# Patient Record
Sex: FEMALE | Race: WHITE | NOT HISPANIC OR LATINO | Employment: OTHER
[De-identification: names, ages, dates, MRNs, and addresses within clinical notes are randomized per-mention and may not be internally consistent; named-entity substitution may affect disease eponyms.]

---

## 2017-08-24 ENCOUNTER — HISTORIC RESULTS (OUTPATIENT)
Dept: ADMINISTRATIVE | Age: 72
End: 2017-08-24

## 2019-05-07 ENCOUNTER — DOCUMENTATION ONLY (OUTPATIENT)
Dept: OTHER | Facility: CLINIC | Age: 74
End: 2019-05-07

## 2019-12-06 ENCOUNTER — TRANSFERRED RECORDS (OUTPATIENT)
Dept: HEALTH INFORMATION MANAGEMENT | Facility: OTHER | Age: 74
End: 2019-12-06

## 2019-12-06 LAB
CREAT SERPL-MCNC: 0.58 MG/DL (ref 0.57–1.11)
GFR SERPL CREATININE-BSD FRML MDRD: >60 ML/MIN/1.73M2
GLUCOSE SERPL-MCNC: 92 MG/DL (ref 70–100)
POTASSIUM SERPL-SCNC: 3.7 MMOL/L (ref 3.5–5.1)

## 2019-12-11 DIAGNOSIS — Z86.73 HISTORY OF STROKE: Primary | ICD-10-CM

## 2019-12-19 ENCOUNTER — HOSPITAL ENCOUNTER (OUTPATIENT)
Dept: SPEECH THERAPY | Facility: OTHER | Age: 74
Setting detail: THERAPIES SERIES
End: 2019-12-19
Attending: PHYSICAL MEDICINE & REHABILITATION
Payer: COMMERCIAL

## 2019-12-19 ENCOUNTER — HOSPITAL ENCOUNTER (OUTPATIENT)
Dept: OCCUPATIONAL THERAPY | Facility: OTHER | Age: 74
Setting detail: THERAPIES SERIES
End: 2019-12-19
Attending: PHYSICAL MEDICINE & REHABILITATION
Payer: COMMERCIAL

## 2019-12-19 DIAGNOSIS — Z86.73 HISTORY OF STROKE: ICD-10-CM

## 2019-12-19 DIAGNOSIS — F32.A DEPRESSION: ICD-10-CM

## 2019-12-19 DIAGNOSIS — G25.81 RESTLESS LEGS SYNDROME (RLS): ICD-10-CM

## 2019-12-19 DIAGNOSIS — E87.1 HYPONATREMIA: ICD-10-CM

## 2019-12-19 DIAGNOSIS — E53.8 VITAMIN B12 DEFICIENCY: ICD-10-CM

## 2019-12-19 DIAGNOSIS — B96.20 E. COLI UTI: ICD-10-CM

## 2019-12-19 DIAGNOSIS — R33.9 URINARY RETENTION: ICD-10-CM

## 2019-12-19 DIAGNOSIS — J30.9 ALLERGIC RHINITIS: ICD-10-CM

## 2019-12-19 DIAGNOSIS — R04.0 EPISTAXIS: ICD-10-CM

## 2019-12-19 DIAGNOSIS — E55.9 VITAMIN D DEFICIENCY: ICD-10-CM

## 2019-12-19 DIAGNOSIS — I63.9 STROKE (H): ICD-10-CM

## 2019-12-19 DIAGNOSIS — M81.0 POSTMENOPAUSAL OSTEOPOROSIS: ICD-10-CM

## 2019-12-19 DIAGNOSIS — G93.40 ENCEPHALOPATHY: ICD-10-CM

## 2019-12-19 DIAGNOSIS — E78.5 DYSLIPIDEMIA: ICD-10-CM

## 2019-12-19 DIAGNOSIS — I71.21 ANEURYSM, ASCENDING AORTA (H): ICD-10-CM

## 2019-12-19 DIAGNOSIS — M19.90 DEGENERATIVE ARTHRITIS: ICD-10-CM

## 2019-12-19 DIAGNOSIS — G47.33 OSA (OBSTRUCTIVE SLEEP APNEA): ICD-10-CM

## 2019-12-19 DIAGNOSIS — I60.9 SAH (SUBARACHNOID HEMORRHAGE) (H): ICD-10-CM

## 2019-12-19 DIAGNOSIS — J34.1 MAXILLARY SINUS CYST: ICD-10-CM

## 2019-12-19 DIAGNOSIS — B37.31 YEAST INFECTION OF THE VAGINA: ICD-10-CM

## 2019-12-19 DIAGNOSIS — I10 HTN (HYPERTENSION): ICD-10-CM

## 2019-12-19 DIAGNOSIS — I82.409 DVT (DEEP VENOUS THROMBOSIS) (H): ICD-10-CM

## 2019-12-19 DIAGNOSIS — M47.816 OSTEOARTHRITIS OF LUMBAR SPINE: ICD-10-CM

## 2019-12-19 DIAGNOSIS — H53.2 DIPLOPIA: ICD-10-CM

## 2019-12-19 DIAGNOSIS — E03.9 HYPOTHYROIDISM: ICD-10-CM

## 2019-12-19 DIAGNOSIS — D64.9 ANEMIA: ICD-10-CM

## 2019-12-19 DIAGNOSIS — N39.0 E. COLI UTI: ICD-10-CM

## 2019-12-19 DIAGNOSIS — F17.200 TOBACCO USE DISORDER: ICD-10-CM

## 2019-12-19 DIAGNOSIS — G47.00 INSOMNIA: ICD-10-CM

## 2019-12-19 PROCEDURE — 97530 THERAPEUTIC ACTIVITIES: CPT | Mod: GO | Performed by: OCCUPATIONAL THERAPIST

## 2019-12-19 PROCEDURE — 97165 OT EVAL LOW COMPLEX 30 MIN: CPT | Mod: GO | Performed by: OCCUPATIONAL THERAPIST

## 2019-12-19 PROCEDURE — 96125 COGNITIVE TEST BY HC PRO: CPT | Mod: GN

## 2019-12-19 RX ORDER — FAMOTIDINE 40 MG/1
20 TABLET, FILM COATED ORAL 2 TIMES DAILY
COMMUNITY

## 2019-12-19 RX ORDER — FERROUS GLUCONATE 324(37.5)
1 TABLET ORAL 2 TIMES DAILY
COMMUNITY

## 2019-12-19 RX ORDER — ALBUTEROL SULFATE 90 UG/1
2 AEROSOL, METERED RESPIRATORY (INHALATION) 4 TIMES DAILY PRN
COMMUNITY
Start: 2016-06-20

## 2019-12-19 RX ORDER — ACETAMINOPHEN 325 MG/1
650 TABLET ORAL EVERY 4 HOURS PRN
COMMUNITY

## 2019-12-19 RX ORDER — CLONIDINE HYDROCHLORIDE 0.1 MG/1
0.1 TABLET ORAL 2 TIMES DAILY
COMMUNITY
Start: 2017-08-24

## 2019-12-19 RX ORDER — LOSARTAN POTASSIUM 50 MG/1
75 TABLET ORAL 2 TIMES DAILY
COMMUNITY
Start: 2017-11-16

## 2019-12-19 RX ORDER — AMOXICILLIN 250 MG
2 CAPSULE ORAL DAILY
COMMUNITY

## 2019-12-19 RX ORDER — FLUTICASONE PROPIONATE 50 MCG
1 SPRAY, SUSPENSION (ML) NASAL 2 TIMES DAILY
COMMUNITY

## 2019-12-19 RX ORDER — AMLODIPINE BESYLATE 10 MG/1
10 TABLET ORAL DAILY
COMMUNITY

## 2019-12-19 RX ORDER — PRAMIPEXOLE DIHYDROCHLORIDE 0.5 MG/1
0.5 TABLET ORAL AT BEDTIME
COMMUNITY

## 2019-12-19 RX ORDER — LANOLIN ALCOHOL/MO/W.PET/CERES
800 CREAM (GRAM) TOPICAL DAILY
COMMUNITY

## 2019-12-19 RX ORDER — TAMSULOSIN HYDROCHLORIDE 0.4 MG/1
0.8 CAPSULE ORAL AT BEDTIME
COMMUNITY

## 2019-12-19 RX ORDER — NITROGLYCERIN 0.4 MG/1
0.4 TABLET SUBLINGUAL
COMMUNITY
Start: 2017-10-11

## 2019-12-19 RX ORDER — METOPROLOL TARTRATE 25 MG/1
25 TABLET, FILM COATED ORAL 2 TIMES DAILY
COMMUNITY

## 2019-12-19 RX ORDER — ATORVASTATIN CALCIUM 80 MG/1
80 TABLET, FILM COATED ORAL AT BEDTIME
COMMUNITY

## 2019-12-19 RX ORDER — LANOLIN ALCOHOL/MO/W.PET/CERES
1 CREAM (GRAM) TOPICAL EVERY 4 HOURS PRN
COMMUNITY

## 2019-12-19 RX ORDER — ASPIRIN 81 MG/1
81 TABLET ORAL DAILY
COMMUNITY
Start: 2016-02-05

## 2019-12-19 ASSESSMENT — VISUAL ACUITY: OU: BLURRY

## 2019-12-19 NOTE — PROGRESS NOTES
"   12/19/19 1400   Quick Adds   Quick Adds Certification   Type of Visit Initial Outpatient Occupational Therapy Evaluation   General Information   Start Of Care Date 12/19/19   Referring Physician Yossi Cardona md   Orders Evaluate and treat as indicated   Other Orders Driving if applicable.    Orders Date 12/11/19   Medical Diagnosis Z86.73 (ICD-10-CM) - History of stroke   Onset of Illness/Injury or Date of Surgery 11/08/19   Special Instructions     Surgical/Medical History Reviewed Yes   Additional Occupational Profile Info/Pertinent History of Current Problem Patient reports she had a CVA on 11/8/2019. SHe was flown to [facility]. She was in the hospital from 11/8/2019 until 12/17/2019.  She was in inpatient rehab for part of that time though is not sure how long. She is unsure how long was in each setting. SHe is wearing an eye patch and reprots she sees double. SHe is to change the eye patch form one eye to the other. She states she was not told that she can not drive, however she is not wanting to drive until her vision is better.    Comments/Observations Pt. struggles with decrease vision, FWW and catheter cord.    Role/Living Environment   Patient role/Employment history Retired   Current Living Environment House   Number of Stairs to Enter Home 4   Number of Stairs Within Home to basement for washer and dryer.    Primary Bathroom Location/Comments mainfloor   Prior Level - Transfers Independent   Prior Level - Ambulation Independent   Prior Level - ADLS Independent   Current Assistive Devices - Mobility Front wheeled walker   Role/Living Environment Comments Patient's  drove patient. He seems attentive to her needs and willing nad able ot assist as needed.   Patient/family Goals Statement \"to get rid of the catheter.    Pain   Patient currently in pain No;Denies   Fall Risk Screen   Fall screen completed by OT   Have you fallen 2 or more times in the past year? No   Have you fallen and had " an injury in the past year? No   Is patient a fall risk? Department fall risk interventions implemented   Abuse Screen (yes response referral indicated)   Feels Unsafe at Home or Work/School no   Feels Threatened by Someone no   Does Anyone Try to Keep You From Having Contact with Others or Doing Things Outside Your Home? no   Physical Signs of Abuse Present no   Cognitive Status Examination   Orientation Orientation to person, place and time   Level of Consciousness Alert   Memory Impaired  (reports no change was having trouble thinking before. )   Memory Comments reports no change was having trouble thinking before.    Attention No deficits were identified   Visual Perception   Visual Perception Wears glasses   Visual Acuity Blurry   Visual Field WFL   Visual Attention WNL   Oculomotor lazy right eye   Sensation   Sensation Comments Denies any numbness or tingling in her fingers.    Range of Motion (ROM)   ROM Comments WNL   Hand Strength   Hand Dominance Right   Left Hand  (pounds) 43 pounds   Right Hand  (pounds) 43 pounds   Left Lateral Pinch (pounds) 10 pounds   Right Lateral Pinch (pounds) 10 pounds   Left Three Point Pinch (pounds) 8 pounds   Right Three Point Pinch (pounds) 9 pounds   Muscle Tone   Muscle Tone No deficits were identified   Coordination   Upper Extremity Coordination No deficits were identified   Planned Therapy Interventions   Planned Therapy Interventions Coordination training;Therapeutic activities;Visual perception    OT Goal 1   Goal Description Patient will demonstrate the ability to perform 4-corner jumps with no head movement 95% of the time.    Target Date 01/09/20    OT Goal 2   Goal Description Pateint will report the ability to perform home program independently or with assist from  as needed   Target Date 01/09/20    OT Goal 3   Goal Description Pateint will report a 50 % decrease in symptoms    Target Date 10/29/20   Clinical Impression   Criteria for Skilled  Therapeutic Interventions Met Yes, treatment indicated   OT Diagnosis decline in self cares and home management    Influenced by the following impairments decrease in vision   Assessment of Occupational Performance 1-3 Performance Deficits   Identified Performance Deficits driving, cooking,    Clinical Decision Making (Complexity) Low complexity   Therapy Frequency 1-2x weeks   Predicted Duration of Therapy Intervention (days/wks) 8 weeks   Risks and Benefits of Treatment have been explained. Yes   Patient, Family & other staff in agreement with plan of care Yes   Clinical Impression Comments patient is able to function well with one eye covered at this time.    Education Assessment   Barriers To Learning No Barriers   Preferred Learning Style Listening;Reading;Demonstration;Pictures/video   Therapy Certification   Certification date from 12/19/19   Certification date to 02/17/20   Total Evaluation Time   OT Eval, Low Complexity Minutes (40432) 20

## 2019-12-19 NOTE — PROGRESS NOTES
[facility]          OUTPATIENT OCCUPATIONAL THERAPY  EVALUATION  PLAN OF TREATMENT FOR OUTPATIENT REHABILITATION  (COMPLETE FOR INITIAL CLAIMS ONLY)  Patient's Last Name, First Name, M.I.  YOB: 1945  Altagracia Johnson                        Provider's Name  [facility] Medical Record No.  [id number]                               Onset Date:     11/08/19   Start of Care Date:     12/19/19   Type:     ___PT   _X_OT   ___SLP Medical Diagnosis:     Z86.73 (ICD-10-CM) - History of stroke                          OT Diagnosis:     decline in self cares and home management  Visits from SOC:  1   _________________________________________________________________________________  Plan of Treatment/Functional Goals:  Coordination training, Therapeutic activities, Visual perception                    Goals     Goal Description: Patient will demonstrate the ability to perform 4-corner jumps with no head movement 95% of the time.   Target Date: 01/09/20        Goal Description: Pateint will report the ability to perform home program independently or with assist from  as needed  Target Date: 01/09/20        Goal Description: Pateint will report a 50 % decrease in symptoms   Target Date: 10/29/20         Therapy Frequency: 1-2x weeks     Predicted Duration of Therapy Intervention (days/wks): 8 weeks  Jessenia Chris OT          I CERTIFY THE NEED FOR THESE SERVICES FURNISHED UNDER        THIS PLAN OF TREATMENT AND WHILE UNDER MY CARE .             Physician Signature               Date    X_____________________________________________________         Chente Nguyen MD                ,    Certification date from: 12/19/19, Certification date to: 02/17/20               Referring Physician: Yossi Cardona md/ Primary care provider Chente Nguyen MD       Initial Assessment        See Epic  Evaluation      Start Of Care Date: 12/19/19

## 2019-12-23 NOTE — PROGRESS NOTES
12/20/19 1200   General Information   Type of Evaluation Cognitive-Linguistic   Type Of Visit Initial   Start Of Care Date 12/19/19   Referring Physician Delio Cardona   Orders Evaluate And Treat   Medical Diagnosis History of stroke    Onset Of Illness/injury Or Date Of Surgery 11/08/19   Hearing WFL   Surgical/Medical history reviewed Yes   Pertinent History Of Current Problem Patient is a 74-year-old female who presents to outpatient SLP evaluation with her . Patient suffered a stroke on 11/8/2019 and was hospitalized in [place] until 12/17/2019. During her inpatient stay, patient was participating in speech therapy to address high level cognitive linguistic deficits including attention and memory.  At this time, patient and  report ongoing cognitive deficits including short term memory and attention. Patient and spouse would like to pursue outpatient therapy to increase cognitive linguistic skills to baseline.     Current Community Support  Family/friend caregiver;Therapy services   Living environment [facility]   Patient/family Goals Altagracia is agreeable to all evaluation tasks. Altagracia reports continued mild cognitive deficits, however reports she feels she is nearing baseline following inpatient therapy. Altagracia and her  would like to pursue outpatient therapy to increase cognitive skills to baseline.    Fall Risk Screen   Fall screen completed by OT   Have you fallen 2 or more times in the past year? No   Have you fallen and had an injury in the past year? No   Is patient a fall risk? Department fall risk interventions implemented   Abuse Screen (yes response referral indicated)   Feels Unsafe at Home or Work/School no   Feels Threatened by Someone no   Does Anyone Try to Keep You From Having Contact with Others or Doing Things Outside Your Home? no   Physical Signs of Abuse Present no   Pain Assessment   Pain Reported No   Speech   Speech Comments Speech clear; no dysarthria present     Language: Auditory Comprehension (understanding of spoken language)   Comments (Auditory Comprehension) Able to follow directions for participation in evaluation tasks; able to participate in conversation. No comprehension deficits observed.    Language: Verbal Expression (use of spoken language to express information)   Comments (Verbal Expression) Patient participating in social and medical conversations with no word-finding difficulty observed.    Cognitive Status Examination   Attention impaired   Behavioral Observations alert;WFL   Orientation x4   Visual Impairments Include other (see comments)  (Double vision. Patient wears an eyepatch to correct)   Short Term Memory impaired   Long Term Memory intact   Reasoning intact   Organization Mildly impaired    Executive Function Deficits Noted organization   Standardized cognitive-linguistic assessment completed CLQT   Education Assessment   Barriers to Learning Cognitive  (Attention; Memory )   Preferred Learning Style Listening;Demonstration;Pictures/video   General Therapy Interventions   Planned Therapy Interventions Cognitive Treatment   Cognitive treatment Internal memory strategy training;Progressive attention training;External memory strategy training   Clinical Impression, SLP Eval   Criteria for Skilled Therapeutic Interventions Met (SLP Eval) skilled criteria for speech language intervention met   SLP Diagnosis Mild cognitive deficits    Influenced by the following factors/impairments CVA on 11/8/2019   Functional limitations due to impairments Difficulty with immediate recall; difficulty attending to tasks    Rehab potential affected by Altagracia is highly motivated to participate in outpatient speech therapy. Her , Santiago, is very supportive.    Therapy Frequency 1 time;2 times;per week   Predicted Duration of Therapy Intervention (days/wks) 60 days    Risks and Benefits of Treatment have been explained. Yes   Patient, Family & other staff in  agreement with plan of care Yes   Clinical Impression Comments Altagracia will benefit from outpatient speech therapy to address cognitive deficits, including memory , attention, and organization.    Cognitive/Communication Goals   Cognitive/Communication Goals 1;2;3;4;5   Cognitive/Communication Goal 1   Goal Identifier Sustained Attention    Goal Description Altagracia will demonstrate sustained attention by maintaining focus during a task for 5 minutes with minimum cues from current level of 2 minutes.    Target Date 02/17/20   Cognitive/Communication Goal 2   Goal Identifier Functional Immediate Recall    Goal Description Altagracia will recall 90% of relevant details immediately following verbal presentation with modified independence including internal and external memory strategies.    Target Date 02/17/20   Cognitive/Communication Goal 3   Goal Identifier Deductive Reasoning    Goal Description Patient will complete moderately complex visual deductive reasoning puzzles with 90% accuracy and modified independence, extra time.    Target Date 02/17/20   Cognitive/Communication Goal 4   Goal Identifier Divided Attention    Goal Description Patient will demonstrate divided attention by responding to multiple tasks or details within tasks with 90% accuracy and minimum SLP supports.    Target Date 02/17/19   Cognitive/Communication Goal 5   Goal Identifier Generative Naming    Goal Description Patient will name 20 members of a given category from current level of 11 with minimum SLP supports.    Target Date 02/17/20   Total Session Time   Total Evaluation Time 45   Therapy Certification   Certification date from 12/19/19   Certification date to 02/17/20   Medical Diagnosis Z86.73 (ICD-10-CM) - History of stroke

## 2019-12-23 NOTE — PROGRESS NOTES
Speech Language Pathology     Cognitive Linguistic Quick Test (CLQT)    SUMMARY OF TEST:    The CLQT assesses visual attention and perception, working memory and language output skills, as well as auditory memory and comprehension.  Non-linguistic tasks can help assess planning, and self-monitoring, visual discrimination and analysis, as well as creativity and mental flexibility.   Together, these subtests assess the cognitive domains of attention, memory, executive function, language, and visuospatial skills using a severity rating of either WNL (within normal limits), Mild, Moderate or Severe.    RESULTS OF TESTING:   Attention    Score: 89    Severity Rating: Moderate   Memory    Score: 158    Severity Rating: WNL    Executive Functions    Score: 29    Severity Rating: WNL    Language    Score: 30    Severity Rating: WNL   Visuospatial Skills    Score: 52    Severity Rating: Mild    Composite Severity Rating    Score: 3.4    Severity Rating: Mild    Clock Drawing     Score: 13    Severity Rating: WNL     INTERPRETATION OF TEST RESULTS: Altagracia presents with moderate attention deficits and mild Visuospatial deficits. Altagracia and he  also report some short term memory and organizational concerns. Altagracia will benefit from outpatient speech therapy to address cognitive deficits.    TIME ADMINISTERING TEST: 45  TIME FOR INTERPRETATION AND PREPARATION OF REPORT: 15  TOTAL TIME: 60  Reference:  Cori Richards, Thor, CCC-SLP, (2001) [employer]

## 2019-12-23 NOTE — PROGRESS NOTES
[facility]          OUTPATIENT SPEECH LANGUAGE PATHOLOGY LANGUAGE-COGNITION  EVALUATION  PLAN OF TREATMENT FOR OUTPATIENT REHABILITATION  (COMPLETE FOR INITIAL CLAIMS ONLY)  Patient's Last Name, First Name, M.I.  YOB: 1945  Altagracia Johnson  SHAYY                        Provider s Name: [facility] Medical Record No.  [id number]     Onset Date:  11/08/19   Start of Care Date: 12/19/19   Type:     ___PT  __OT   _X_SLP    Medical Diagnosis:  (P) Z86.73 (ICD-10-CM) - History of stroke   Speech Language Pathology Diagnosis:  Mild cognitive deficits     Visits from SOC: 1                                        ________________________________________________________________________________  Plan of Treatment/Functional Goals:   Planned Therapy Interventions: Cognitive Treatment; Internal memory strategy training;Progressive attention training;External memory strategy training      Communication Goals   1. Goal Identifier: Sustained Attention        Goal Description: Altagracia will demonstrate sustained attention by maintaining focus during a task for 5 minutes with minimum cues from current level of 2 minutes.        Target Date: 02/17/20   2. Goal Identifier: Functional Immediate Recall        Goal Description: Altagracia will recall 90% of relevant details immediately following verbal presentation with modified independence including internal and external memory strategies.        Target Date: 02/17/20   3. Goal Identifier: Deductive Reasoning        Goal Description: Patient will complete moderately complex visual deductive reasoning puzzles with 90% accuracy and modified independence, extra time.        Target Date: 02/17/20   4. Goal Identifier: Divided Attention        Goal Description: Patient will demonstrate divided attention by responding to multiple tasks or details within tasks with 90%  accuracy and minimum SLP supports.        Target Date: 02/17/19   5. Goal Identifier: Generative Naming        Goal Description: Patient will name 20 members of a given category from current level of 11 with minimum SLP supports.        Target Date: 02/17/20                Predicted Duration of Therapy Intervention (days/wks): 1-2 days per week  60 days     JENIFER Villarreal       I CERTIFY THE NEED FOR THESE SERVICES FURNISHED UNDER        THIS PLAN OF TREATMENT AND WHILE UNDER MY CARE .             Physician Signature               Date    X_____________________________________________________    PCP: Dr. Chente Nguyen                       Certification Date From:  12/19/19  Certification Date To:   02/17/20          Referring Physician:  Delio Cardona  PCP: Dr. Chente Nguyen     Initial Assessment        See Epic Evaluation Start Of Care Date: 12/19/19

## 2019-12-30 ENCOUNTER — OFFICE VISIT (OUTPATIENT)
Dept: UROLOGY | Facility: OTHER | Age: 74
End: 2019-12-30
Attending: UROLOGY
Payer: COMMERCIAL

## 2019-12-30 ENCOUNTER — HOSPITAL ENCOUNTER (OUTPATIENT)
Dept: PHYSICAL THERAPY | Facility: OTHER | Age: 74
Setting detail: THERAPIES SERIES
End: 2019-12-30
Attending: PHYSICAL MEDICINE & REHABILITATION
Payer: COMMERCIAL

## 2019-12-30 VITALS
HEART RATE: 60 BPM | SYSTOLIC BLOOD PRESSURE: 98 MMHG | RESPIRATION RATE: 18 BRPM | WEIGHT: 159.2 LBS | DIASTOLIC BLOOD PRESSURE: 50 MMHG

## 2019-12-30 DIAGNOSIS — R33.9 URINARY RETENTION: Primary | ICD-10-CM

## 2019-12-30 DIAGNOSIS — Z86.73 HISTORY OF STROKE: ICD-10-CM

## 2019-12-30 PROCEDURE — 97112 NEUROMUSCULAR REEDUCATION: CPT | Mod: GP

## 2019-12-30 PROCEDURE — 51700 IRRIGATION OF BLADDER: CPT | Performed by: UROLOGY

## 2019-12-30 PROCEDURE — 51798 US URINE CAPACITY MEASURE: CPT | Performed by: UROLOGY

## 2019-12-30 PROCEDURE — 97162 PT EVAL MOD COMPLEX 30 MIN: CPT | Mod: GP

## 2019-12-30 PROCEDURE — G0463 HOSPITAL OUTPT CLINIC VISIT: HCPCS | Mod: 25

## 2019-12-30 PROCEDURE — 99203 OFFICE O/P NEW LOW 30 MIN: CPT | Mod: 25 | Performed by: UROLOGY

## 2019-12-30 PROCEDURE — 97110 THERAPEUTIC EXERCISES: CPT | Mod: GP

## 2019-12-30 ASSESSMENT — PAIN SCALES - GENERAL: PAINLEVEL: NO PAIN (0)

## 2019-12-30 NOTE — PROGRESS NOTES
Type of Visit  NPV    Chief Complaint  Urinary retention    HPI  Ms Johnson is a 74 year old female who presents with urinary retention.  The patient has a history of back surgery 7 years ago.  Patient was recently (2 weeks ago) discharged from [facility] following an 11-day hospital stay for a subarachnoid hemorrhage.  As an inpatient she was identified as having urinary retention.  Because of this the catheter was placed.  She follows up today for a void trial.  As an inpatient she was seen by urology and Urecholine was attempted and she failed the void trial.  As inpatient she was also diagnosed with UTI and given a course of culture specific antibiotics.  Today she denies fevers or chills.    This hospital visit was conducted through the use of outside hospital records.      Past Medical History  She  has a past medical history of History of coronary artery disease and Personal history of subarachnoid hemorrhage.  Patient Active Problem List   Diagnosis     E. coli UTI     Stroke (H)     SAH (subarachnoid hemorrhage) (H)     HTN (hypertension)     Epistaxis     Vitamin B12 deficiency     Anemia     Encephalopathy     Hyponatremia     Urinary retention     DVT (deep venous thrombosis) (H)     RHONA (obstructive sleep apnea)     Diplopia     Restless legs syndrome (RLS)     Insomnia     Yeast infection of the vagina     Osteoarthritis of lumbar spine     Tobacco use disorder     Depression     Postmenopausal osteoporosis     Allergic rhinitis     Dyslipidemia     Maxillary sinus cyst     Aneurysm, ascending aorta (H)     Degenerative arthritis     Hypothyroidism     Vitamin D deficiency       Past Surgical History  She  has no past surgical history on file.    Medications  She has a current medication list which includes the following prescription(s): acetaminophen, albuterol, amlodipine, aspirin, atorvastatin, calcium citrate-vitamin d, carboxymethylcellulose sodium, cholecalciferol, clonidine,  famotidine, ferrous gluconate, fluticasone, folic acid, losartan, metoprolol tartrate, mineral oil-white petrolatum, nitroglycerin, pramipexole, senna-docusate, tamsulosin, vitamin b-12, and white petrolatum-mineral oil.    Allergies  Allergies   Allergen Reactions     Sulfa Drugs Hives       Social History  She  reports that she quit smoking about 7 weeks ago. Her smoking use included cigarettes. She has never used smokeless tobacco. She reports previous alcohol use. She reports current drug use. Drug: Marijuana.  No drug abuse.    Family History  No family history on file.    Review of Systems  I personally reviewed the ROS with the patient.    Nursing Notes:   Yuko Velasquez RN  12/30/2019  9:12 AM  Signed  Review of Systems:    Weight loss:    No     Recent fever/chills:  Yes   Night sweats:   No  Current skin rash:  No   Recent hair loss:  No  Heat intolerance:  No   Cold intolerance:  Yes  Chest pain:   No   Palpitations:   No  Shortness of breath:  No   Wheezing:   No  Constipation:    Yes   Diarrhea:   No   Nausea:   No   Vomiting:   No   Kidney/side pain:  Yes   Back pain:   Yes  Frequent headaches:  No   Dizziness:     Yes  Leg swelling:   No   Calf pain:    No    Parents, brothers or sisters with history of kidney cancer:   No  Parents, brothers or sisters with history of bladder cancer: No    Yuko Velasuqez RN  12/30/2019 10:01 AM  Signed  Void Trial  Verbal order read back by Sebas Law MD to perform void trial and post void residual bladder scan.  Patient's bladder was filled under gravity with 275mL of sterile saline.  Patient voided 20mL.  Post-void residual was measured by ultrasonic bladder scanner: 290 mL    Jessenia Paulson LPN  12/30/2019  3:00 PM  Signed  Post-Void Residual  A post-void residual was measured by ultrasonic bladder scanner.  324 mL      Physical Exam  Vitals:    12/30/19 0902   BP: 98/50   BP Location: Right arm   Patient Position: Sitting   Cuff Size: Adult  Regular   Pulse: 60   Resp: 18   Weight: 72.2 kg (159 lb 3.2 oz)     Constitutional: No acute distress.  Alert and cooperative   Head: NCAT  Eyes: Conjunctivae normal  Cardiovascular: Regular rate.  Pulmonary/Chest: Respirations are even and non-labored bilaterally, no audible wheezing  Abdominal: Soft. No distension, tenderness, masses or guarding.   Neurological: A + O x 3.  Cranial Nerves II-XII grossly intact.  Extremities: SUSAN x 4, Warm. No clubbing.  No cyanosis.    Skin: Pink, warm and dry.  No visible rashes noted.  Psychiatric:  Normal mood and affect  Back:  No left CVA tenderness.  No right CVA tenderness.  Genitourinary:  Catheter in place draining clear urine    Post-Void Residual  A post-void residual was measured by ultrasonic bladder scanner.  300 mL    Assessment  Ms. Johnson is a 74 year old female who presents with urinary retention requiring catheter placement.  Results of the void trial today were mixed.  He is comfortable and is voiding so we will trial a period without catheterization.    Plan  Follow-up in 1 to 2 weeks for a PVR

## 2019-12-30 NOTE — NURSING NOTE
Review of Systems:    Weight loss:    No     Recent fever/chills:  Yes   Night sweats:   No  Current skin rash:  No   Recent hair loss:  No  Heat intolerance:  No   Cold intolerance:  Yes  Chest pain:   No   Palpitations:   No  Shortness of breath:  No   Wheezing:   No  Constipation:    Yes   Diarrhea:   No   Nausea:   No   Vomiting:   No   Kidney/side pain:  Yes   Back pain:   Yes  Frequent headaches:  No   Dizziness:     Yes  Leg swelling:   No   Calf pain:    No    Parents, brothers or sisters with history of kidney cancer:   No  Parents, brothers or sisters with history of bladder cancer: No

## 2019-12-30 NOTE — NURSING NOTE
Void Trial  Verbal order read back by Sebas Law MD to perform void trial and post void residual bladder scan.  Patient's bladder was filled under gravity with 275mL of sterile saline.  Patient voided 20mL.  Post-void residual was measured by ultrasonic bladder scanner: 290 mL

## 2019-12-30 NOTE — PROGRESS NOTES
[facility]        OUTPATIENT PHYSICAL THERAPY FUNCTIONAL EVALUATION  PLAN OF TREATMENT FOR OUTPATIENT REHABILITATION  (COMPLETE FOR INITIAL CLAIMS ONLY)  Patient's Last Name, First Name, M.I.  YOB: 1945  Altagracia Johnson     Provider's Name   [facility]   Medical Record No.  [id number]     Start of Care Date:  12/30/19   Onset Date:      Type:     _X__PT   ____OT  ____SLP Medical Diagnosis:  History of a stroke     PT Diagnosis:  CVA with decreased gait and balance Visits from SOC:  1                              __________________________________________________________________________________  Plan of Treatment/Functional Goals:  balance training, gait training, neuromuscular re-education, strengthening, stretching, transfer training           GOALS  gait  Pt able to ambulate level surfaces without assistive device safely in 8 weeks.   02/24/20    balance  Pt safe with dynamic activities including cleaning and cooking in 8 weeks.   02/24/20    HEP  Pt able to manage residual sx with HEP in 8 weeks.   02/24/20         Therapy Frequency:  2 times/Week   Predicted Duration of Therapy Intervention:  8 weeks    Steph Vasquez, PT                                    I CERTIFY THE NEED FOR THESE SERVICES FURNISHED UNDER        THIS PLAN OF TREATMENT AND WHILE UNDER MY CARE .             Physician Signature               Date    X_____________________________________________________                      Certification Date From:  12/30/19   Certification Date To:  02/24/20    Referring Provider:  Delio Cardona MD  PCP: Dr. Chente Nguyen     Initial Assessment  See Epic Evaluation- Start of Care Date: 12/30/19

## 2019-12-30 NOTE — PROGRESS NOTES
[facility] Evaluation      12/30/19 1000   Quick Adds   Quick Adds Certification   Type of Visit Initial OP PT Evaluation   General Information   Start of Care Date 12/30/19   Referring Physician Delio Cardona MD   Orders Evaluate and Treat as Indicated   Order Date 12/11/19   Medical Diagnosis History of a stroke   Surgical/Medical history reviewed Yes  (back surgery)   Pertinent history of current problem (include personal factors and/or comorbidities that impact the POC) CVA on 11/8/2019. She was flown to [facility]. She was in the hospital from 11/8/2019 until 12/17/2019.  She was in inpatient rehab for part of that time.  She feels she's improving still, ambulating with FWW all the time, didn't use AD prior to stroke.  She has double vision when she doesn't wear her eye patch, when she removed today, eyes significantly converge.   has been helping with everything.  She has 1 step outside and 3 in the house and she goes up and down with railing, he carries her walker for her.  She did try some dishes the other day and a little cleaning, not vacuuming or cooking yet.    Pertinent Visual History  glasses, Used eye patch on one eye today to help with double vision.    Patient role/Employment history Retired   Living environment [facility]   Fall Risk Screen   Fall screen completed by PT   Have you fallen 2 or more times in the past year? No   Have you fallen and had an injury in the past year? Yes   Is patient a fall risk? Yes;Department fall risk interventions implemented   Fall screen comments had a fall when she got up when she had been drinking, no other falls, more unsteady now since CVA   Abuse Screen (yes response referral indicated)   Feels Unsafe at Home or Work/School no   Feels Threatened by Someone no   Cognitive Status Examination   Orientation orientation to person, place and time   Level of Consciousness alert   Follows Commands and Answers Questions  100% of the time   Personal Safety and Judgment at risk behaviors demonstrated  (moves quickly)   Memory impaired   Observation   Observation  helps answer some questions   Integumentary   Integumentary No deficits were identified   Strength   Strength Comments hip flex 4/5, hip add ext abd, knee flex and ext 5-/5   Gait   Gait Comments using FWW, tried with Single point cane and mild gait deviation, improved with practice, tried with no assistive device and significant weaving   Balance Special Tests   Balance Special Tests Single leg stance right;Single leg stance left;Romberg   Balance Special Tests Single Leg Stance Right,   Right, seconds 10 Seconds   Comments with one fingertip assist   Balance Special Tests Single Leg Stance Left   Left, seconds 10 Seconds   Comments with one fingertip assist, less sway than R   Balance Special Tests Romberg   Seconds 20 Seconds   Comments with eyes closed   Muscle Tone   Muscle Tone no deficits were identified   Planned Therapy Interventions   Planned Therapy Interventions balance training;gait training;neuromuscular re-education;strengthening;stretching;transfer training   Clinical Impression   Criteria for Skilled Therapeutic Interventions Met yes, treatment indicated   PT Diagnosis CVA with decreased gait and balance   Influenced by the following impairments limited strength, limited balance, limited coordination   Functional limitations due to impairments fall risk, decreased gait, decreased activity tolerance   Clinical Presentation Evolving/Changing   Clinical Presentation Rationale age, activity level, co-morbidities   Clinical Decision Making (Complexity) Moderate complexity   Therapy Frequency 2 times/Week   Predicted Duration of Therapy Intervention (days/wks) 8 weeks   Risk & Benefits of therapy have been explained Yes   Patient, Family & other staff in agreement with plan of care Yes   Education Assessment   Preferred Learning Style Demonstration    Barriers to Learning Visual   GOALS   PT Eval Goals 1;2;3   Goal 1   Goal Identifier gait   Goal Description Pt able to ambulate level surfaces without assistive device safely in 8 weeks.    Target Date 02/24/20   Goal 2   Goal Identifier balance   Goal Description Pt safe with dynamic activities including cleaning and cooking in 8 weeks.    Target Date 02/24/20   Goal 3   Goal Identifier HEP   Goal Description Pt able to manage residual sx with HEP in 8 weeks.    Target Date 02/24/20   Total Evaluation Time   PT Mady, Moderate Complexity Minutes (11369) 30   Therapy Certification   Certification date from 12/30/19   Certification date to 02/24/20   Medical Diagnosis History of a stroke

## 2019-12-31 ENCOUNTER — OFFICE VISIT (OUTPATIENT)
Dept: UROLOGY | Facility: OTHER | Age: 74
End: 2019-12-31
Attending: UROLOGY
Payer: COMMERCIAL

## 2019-12-31 ENCOUNTER — TELEPHONE (OUTPATIENT)
Dept: UROLOGY | Facility: OTHER | Age: 74
End: 2019-12-31

## 2019-12-31 VITALS — HEART RATE: 76 BPM | WEIGHT: 158 LBS | RESPIRATION RATE: 16 BRPM

## 2019-12-31 DIAGNOSIS — R33.9 URINARY RETENTION: Primary | ICD-10-CM

## 2019-12-31 ASSESSMENT — PAIN SCALES - GENERAL: PAINLEVEL: NO PAIN (0)

## 2019-12-31 NOTE — TELEPHONE ENCOUNTER
Patient called and left a message stating she was seen yesterday 12/30/19 and had her catheter removed. She states she was fine during the night but today things are not working.   Please advise.    Odette Winter on 12/31/2019 at 12:16 PM

## 2019-12-31 NOTE — NURSING NOTE
Per verbal order read back by Sebas Law MD, patient is to be taught how to self catheterize.  Patient taught self catheterization using patient instructions. Patient did return demonstration with 460mL output. All questions answered.  Yuko Velasquez RN......December 31, 2019...2:29 PM

## 2019-12-31 NOTE — PATIENT INSTRUCTIONS
Female Clean Intermittent Self-Catheterization (CIC)  Catheterization is a way to empty al the urine from your bladder. This keeps urine from sitting in your bladder. If urine sits in your bladder too long, it can cause a bladder or kidney infection. This is also called self-cath or CIC    Instructions    Catheterize yourself two times a day and as needed    If you feel you need to catheterize more frequently do so    Try to allow yourself to urinate prior to catheterizing    Supplies    Catheter - clear or red rubber tube. Use a short female catheter if you do this procedure on a toilet. Use a long catheter with an extension tube if you do self-cath from a wheelchair    Water-soluble lubricant such as K-Y jelly or Surgilube. Do not use Vaseline    Urine container if needed. Use any jug, bottle or urinal which can attach to the side of a bed, chair, or wheelchair, or which can be held between your knees    Steps to Follow  1. You may catheterize yourself while sitting on the toilet, in a wheelchair, in bed or while standing  2. Wash your hands well with soap and water or use an alcohol based hand   3. Take the catheter out of the plastic bag. Put a small amount of lubricant on the tip of the catheter. Cover the tip about 2 inches up the catheter  4. Separate the folds of the labia with your 2nd and 4th fingers. Wash the urinary opening (urethra) with soap and water  5. Continue to separate the folds with your fingers. Find the urinary opening just above your vagina with your middle finger from the same hand  6. Use your hand that is not holding the labia to  the catheter  7. Put the catheter straight into the urinary opening next to your finge.  8. Push the catheter in about 2 to 3 inches until urine flows freely  9. Let the urine flow into the container or the toilet. An extension tube attached to the end of your catheter will give you the extra tubing needed to reach the toilet from your  wheelchair  10. When urine stops flowing, take some deep breaths or press on your lower abdomen.  11. Slowly pull the catheter out. Continue to take deep breaths or press on your abdomen. Stop pulling the catheter out when urine starts flowing. Repeat this step until the urine completely stops  12. Pinch the end of the catheter to keep urine from spilling on your clothes. Slowly take the catheter out.  13. Wash your hands    Catheter Care  1. Lather up your hands and wash the catheter by rubbing it between your soapy hands.  2. Rinse well with water inside and out  3. Dry with a clean towel or tissue  4. Lay catheter on a clean towel so the inside can air dry  5. Store the catheter in a clean plastic bag or other clean container such as a cosmetic bag or paper towel  6. Catheters may be reused until they become brittle, show wear, crack, or do not drain well    When to Call Your Doctor  Call your doctor if you have any of these signs of infection:    Chills and / or fever    Leaking urine in between catheterization (if this is not normal for you)    Not feeling well, tired, weak    Pain or tenderness across the lower back    Increased muscle or bladder spasms (pain)    Red or swollen urinary opening

## 2019-12-31 NOTE — TELEPHONE ENCOUNTER
Patient states that she feels like she is filling up and unable to empty her bladder.  Sebas Law MD notified of this and states that patient can come to the clinic now and self cath teaching be taught to patient.  Patient notified of this and will come to the clinic now.  Yuko Velasquez RN......December 31, 2019...1:03 PM

## 2020-01-07 ENCOUNTER — HOSPITAL ENCOUNTER (OUTPATIENT)
Dept: SPEECH THERAPY | Facility: OTHER | Age: 75
Setting detail: THERAPIES SERIES
End: 2020-01-07
Attending: PHYSICAL MEDICINE & REHABILITATION
Payer: COMMERCIAL

## 2020-01-07 ENCOUNTER — HOSPITAL ENCOUNTER (OUTPATIENT)
Dept: PHYSICAL THERAPY | Facility: OTHER | Age: 75
Setting detail: THERAPIES SERIES
End: 2020-01-07
Attending: PHYSICAL MEDICINE & REHABILITATION
Payer: COMMERCIAL

## 2020-01-07 ENCOUNTER — HOSPITAL ENCOUNTER (OUTPATIENT)
Dept: OCCUPATIONAL THERAPY | Facility: OTHER | Age: 75
Setting detail: THERAPIES SERIES
End: 2020-01-07
Attending: PHYSICAL MEDICINE & REHABILITATION
Payer: COMMERCIAL

## 2020-01-07 PROCEDURE — 97530 THERAPEUTIC ACTIVITIES: CPT | Mod: GO | Performed by: OCCUPATIONAL THERAPIST

## 2020-01-07 PROCEDURE — 97129 THER IVNTJ 1ST 15 MIN: CPT | Mod: GN

## 2020-01-07 PROCEDURE — 97130 THER IVNTJ EA ADDL 15 MIN: CPT | Mod: GN

## 2020-01-07 PROCEDURE — 97112 NEUROMUSCULAR REEDUCATION: CPT | Mod: GP

## 2020-01-07 PROCEDURE — 97110 THERAPEUTIC EXERCISES: CPT | Mod: GP

## 2020-01-09 ENCOUNTER — OFFICE VISIT (OUTPATIENT)
Dept: UROLOGY | Facility: OTHER | Age: 75
End: 2020-01-09
Attending: UROLOGY
Payer: COMMERCIAL

## 2020-01-09 ENCOUNTER — HOSPITAL ENCOUNTER (OUTPATIENT)
Dept: PHYSICAL THERAPY | Facility: OTHER | Age: 75
Setting detail: THERAPIES SERIES
End: 2020-01-09
Attending: PHYSICAL MEDICINE & REHABILITATION
Payer: COMMERCIAL

## 2020-01-09 ENCOUNTER — HOSPITAL ENCOUNTER (OUTPATIENT)
Dept: SPEECH THERAPY | Facility: OTHER | Age: 75
Setting detail: THERAPIES SERIES
End: 2020-01-09
Attending: PHYSICAL MEDICINE & REHABILITATION
Payer: COMMERCIAL

## 2020-01-09 ENCOUNTER — HOSPITAL ENCOUNTER (OUTPATIENT)
Dept: OCCUPATIONAL THERAPY | Facility: OTHER | Age: 75
Setting detail: THERAPIES SERIES
End: 2020-01-09
Attending: PHYSICAL MEDICINE & REHABILITATION
Payer: COMMERCIAL

## 2020-01-09 VITALS
WEIGHT: 162.4 LBS | DIASTOLIC BLOOD PRESSURE: 80 MMHG | SYSTOLIC BLOOD PRESSURE: 150 MMHG | RESPIRATION RATE: 16 BRPM | HEART RATE: 76 BPM

## 2020-01-09 DIAGNOSIS — R33.9 URINARY RETENTION: Primary | ICD-10-CM

## 2020-01-09 PROCEDURE — 99213 OFFICE O/P EST LOW 20 MIN: CPT | Performed by: UROLOGY

## 2020-01-09 PROCEDURE — G0463 HOSPITAL OUTPT CLINIC VISIT: HCPCS | Mod: 25

## 2020-01-09 PROCEDURE — 97110 THERAPEUTIC EXERCISES: CPT | Mod: GP

## 2020-01-09 PROCEDURE — 97129 THER IVNTJ 1ST 15 MIN: CPT | Mod: GN

## 2020-01-09 PROCEDURE — 97112 NEUROMUSCULAR REEDUCATION: CPT | Mod: GP

## 2020-01-09 PROCEDURE — 97530 THERAPEUTIC ACTIVITIES: CPT | Mod: GO | Performed by: OCCUPATIONAL THERAPIST

## 2020-01-09 PROCEDURE — 51798 US URINE CAPACITY MEASURE: CPT | Performed by: UROLOGY

## 2020-01-09 PROCEDURE — 97130 THER IVNTJ EA ADDL 15 MIN: CPT | Mod: GN

## 2020-01-09 ASSESSMENT — PAIN SCALES - GENERAL: PAINLEVEL: NO PAIN (0)

## 2020-01-09 NOTE — PROGRESS NOTES
Type of Visit  EST    Chief Complaint  Urinary retention    HPI  Ms Johnson is a 74 year old female who follows up with urinary retention.  The patient has a history of back surgery 7 years ago.  Patient was recently discharged from [facility] following an 11-day hospital stay for a subarachnoid hemorrhage.  As an inpatient she was identified as having urinary retention.  Because of this the catheter was placed.  I saw the patient last almost 2 weeks ago.  That time she underwent a void trial and the results were mixed.  We taught her self-catheterization and she performed this 1 or 2 times at home and then discontinued because she started voiding well on her own.  She has no urinary complaints today.  She denies flank pain.      Family History  No family history on file.    Review of Systems  I personally reviewed the ROS with the patient.    Nursing Notes:   Bridgette Moffett LPN  1/9/2020 12:05 PM  Signed  Altagracia Johnson is a 74 year old female presenting today for: follow up on CIC  Medication Reconciliation: complete    Bridgette Chandler LPN 1/9/2020 11:47 AM    Review of Systems:    Weight loss:    No     Recent fever/chills:  No   Night sweats:   No  Current skin rash:  No   Recent hair loss:  yes  Heat intolerance:  No   Cold intolerance:  No  Chest pain:   No   Palpitations:   No  Shortness of breath:  No   Wheezing:   No  Constipation:    yes   Diarrhea:   No   Nausea:   No   Vomiting:   No   Kidney/side pain:  yes   Back pain:   yes  Frequent headaches:  No   Dizziness:     yes  Leg swelling:   yes   Calf pain:    No        Post-Void Residual  A post-void residual was measured by ultrasonic bladder scanner.  300 mL              Physical Exam  Vitals:    01/09/20 1150   BP: (!) 150/80   Pulse: 76   Resp: 16   Weight: 73.7 kg (162 lb 6.4 oz)     Constitutional: No acute distress.  Alert and cooperative   Head: NCAT  Eyes: Conjunctivae normal  Cardiovascular: Regular  rate.  Pulmonary/Chest: Respirations are even and non-labored bilaterally, no audible wheezing  Abdominal: Soft. No distension, tenderness, masses or guarding.   Neurological: A + O x 3.  Cranial Nerves II-XII grossly intact.  Extremities: SUSAN x 4, Warm. No clubbing.  No cyanosis.    Skin: Pink, warm and dry.  No visible rashes noted.  Psychiatric:  Normal mood and affect  Back:  No left CVA tenderness.  No right CVA tenderness.  Genitourinary:  Catheter in place draining clear urine    Post-Void Residual  A post-void residual was measured by ultrasonic bladder scanner.  300 mL today  300 mL (previously)    Assessment  Ms. Johnson is a 74 year old female who follows up with elevated residual.  The elevated residual is asymptomatic.  I explained how an elevated residual can lead to more complicated UTIs as well as kidney issues.  She prefers not to perform self-catheterization.  Explained that if she does develop UTI in the future she would probably need to short-term self catheterize.    Plan  Discontinue Flomax  Follow-up with me as needed

## 2020-01-09 NOTE — NURSING NOTE
Altagracia Johnson is a 74 year old female presenting today for: follow up on CIC  Medication Reconciliation: complete    Bridgette Chandler LPN 1/9/2020 11:47 AM    Review of Systems:    Weight loss:    No     Recent fever/chills:  No   Night sweats:   No  Current skin rash:  No   Recent hair loss:  yes  Heat intolerance:  No   Cold intolerance:  No  Chest pain:   No   Palpitations:   No  Shortness of breath:  No   Wheezing:   No  Constipation:    yes   Diarrhea:   No   Nausea:   No   Vomiting:   No   Kidney/side pain:  yes   Back pain:   yes  Frequent headaches:  No   Dizziness:     yes  Leg swelling:   yes   Calf pain:    No        Post-Void Residual  A post-void residual was measured by ultrasonic bladder scanner.  300 mL             No

## 2020-01-14 ENCOUNTER — HOSPITAL ENCOUNTER (OUTPATIENT)
Dept: SPEECH THERAPY | Facility: OTHER | Age: 75
Setting detail: THERAPIES SERIES
End: 2020-01-14
Attending: PHYSICAL MEDICINE & REHABILITATION
Payer: COMMERCIAL

## 2020-01-14 ENCOUNTER — HOSPITAL ENCOUNTER (OUTPATIENT)
Dept: OCCUPATIONAL THERAPY | Facility: OTHER | Age: 75
Setting detail: THERAPIES SERIES
End: 2020-01-14
Attending: PHYSICAL MEDICINE & REHABILITATION
Payer: COMMERCIAL

## 2020-01-14 ENCOUNTER — HOSPITAL ENCOUNTER (OUTPATIENT)
Dept: PHYSICAL THERAPY | Facility: OTHER | Age: 75
Setting detail: THERAPIES SERIES
End: 2020-01-14
Attending: PHYSICAL MEDICINE & REHABILITATION
Payer: COMMERCIAL

## 2020-01-14 PROCEDURE — 97112 NEUROMUSCULAR REEDUCATION: CPT | Mod: GP,CO

## 2020-01-14 PROCEDURE — 97129 THER IVNTJ 1ST 15 MIN: CPT | Mod: GN

## 2020-01-14 PROCEDURE — 97130 THER IVNTJ EA ADDL 15 MIN: CPT | Mod: GN

## 2020-01-14 PROCEDURE — 97110 THERAPEUTIC EXERCISES: CPT | Mod: GP,CO

## 2020-01-14 PROCEDURE — 97530 THERAPEUTIC ACTIVITIES: CPT | Mod: GO | Performed by: OCCUPATIONAL THERAPIST

## 2020-01-17 ENCOUNTER — HOSPITAL ENCOUNTER (OUTPATIENT)
Dept: OCCUPATIONAL THERAPY | Facility: OTHER | Age: 75
Setting detail: THERAPIES SERIES
End: 2020-01-17
Attending: PHYSICAL MEDICINE & REHABILITATION
Payer: COMMERCIAL

## 2020-01-17 ENCOUNTER — HOSPITAL ENCOUNTER (OUTPATIENT)
Dept: PHYSICAL THERAPY | Facility: OTHER | Age: 75
Setting detail: THERAPIES SERIES
End: 2020-01-17
Attending: PHYSICAL MEDICINE & REHABILITATION
Payer: COMMERCIAL

## 2020-01-17 PROCEDURE — 97112 NEUROMUSCULAR REEDUCATION: CPT | Mod: GP

## 2020-01-17 PROCEDURE — 97530 THERAPEUTIC ACTIVITIES: CPT | Mod: GO | Performed by: OCCUPATIONAL THERAPIST

## 2020-01-17 PROCEDURE — 97110 THERAPEUTIC EXERCISES: CPT | Mod: GP

## 2020-01-21 ENCOUNTER — HOSPITAL ENCOUNTER (OUTPATIENT)
Dept: PHYSICAL THERAPY | Facility: OTHER | Age: 75
Setting detail: THERAPIES SERIES
End: 2020-01-21
Attending: PHYSICAL MEDICINE & REHABILITATION
Payer: COMMERCIAL

## 2020-01-21 ENCOUNTER — HOSPITAL ENCOUNTER (OUTPATIENT)
Dept: OCCUPATIONAL THERAPY | Facility: OTHER | Age: 75
Setting detail: THERAPIES SERIES
End: 2020-01-21
Attending: PHYSICAL MEDICINE & REHABILITATION
Payer: COMMERCIAL

## 2020-01-21 ENCOUNTER — HOSPITAL ENCOUNTER (OUTPATIENT)
Dept: SPEECH THERAPY | Facility: OTHER | Age: 75
Setting detail: THERAPIES SERIES
End: 2020-01-21
Attending: PHYSICAL MEDICINE & REHABILITATION
Payer: COMMERCIAL

## 2020-01-21 PROCEDURE — 97530 THERAPEUTIC ACTIVITIES: CPT | Mod: GO | Performed by: OCCUPATIONAL THERAPIST

## 2020-01-21 PROCEDURE — 97129 THER IVNTJ 1ST 15 MIN: CPT | Mod: GN

## 2020-01-21 PROCEDURE — 97112 NEUROMUSCULAR REEDUCATION: CPT | Mod: GP

## 2020-01-21 PROCEDURE — 97130 THER IVNTJ EA ADDL 15 MIN: CPT | Mod: GN

## 2020-01-21 PROCEDURE — 97110 THERAPEUTIC EXERCISES: CPT | Mod: GP

## 2020-01-23 ENCOUNTER — HOSPITAL ENCOUNTER (OUTPATIENT)
Dept: PHYSICAL THERAPY | Facility: OTHER | Age: 75
Setting detail: THERAPIES SERIES
End: 2020-01-23
Attending: PHYSICAL MEDICINE & REHABILITATION
Payer: COMMERCIAL

## 2020-01-23 PROCEDURE — 97110 THERAPEUTIC EXERCISES: CPT | Mod: GP

## 2020-01-23 PROCEDURE — 97112 NEUROMUSCULAR REEDUCATION: CPT | Mod: GP

## 2020-01-27 ENCOUNTER — HOSPITAL ENCOUNTER (OUTPATIENT)
Dept: PHYSICAL THERAPY | Facility: OTHER | Age: 75
Setting detail: THERAPIES SERIES
End: 2020-01-27
Attending: PHYSICAL MEDICINE & REHABILITATION
Payer: COMMERCIAL

## 2020-01-27 ENCOUNTER — HOSPITAL ENCOUNTER (OUTPATIENT)
Dept: SPEECH THERAPY | Facility: OTHER | Age: 75
Setting detail: THERAPIES SERIES
End: 2020-01-27
Attending: PHYSICAL MEDICINE & REHABILITATION
Payer: COMMERCIAL

## 2020-01-27 PROCEDURE — 97112 NEUROMUSCULAR REEDUCATION: CPT | Mod: GP

## 2020-01-27 PROCEDURE — 97110 THERAPEUTIC EXERCISES: CPT | Mod: GP

## 2020-01-27 PROCEDURE — 97129 THER IVNTJ 1ST 15 MIN: CPT | Mod: GN

## 2020-01-27 PROCEDURE — 97130 THER IVNTJ EA ADDL 15 MIN: CPT | Mod: GN

## 2020-02-03 ENCOUNTER — HOSPITAL ENCOUNTER (OUTPATIENT)
Dept: SPEECH THERAPY | Facility: OTHER | Age: 75
Setting detail: THERAPIES SERIES
End: 2020-02-03
Attending: PHYSICAL MEDICINE & REHABILITATION
Payer: COMMERCIAL

## 2020-02-03 PROCEDURE — 97130 THER IVNTJ EA ADDL 15 MIN: CPT | Mod: GN

## 2020-02-03 PROCEDURE — 97129 THER IVNTJ 1ST 15 MIN: CPT | Mod: GN

## 2020-02-10 ENCOUNTER — HOSPITAL ENCOUNTER (OUTPATIENT)
Dept: PHYSICAL THERAPY | Facility: OTHER | Age: 75
Setting detail: THERAPIES SERIES
End: 2020-02-10
Attending: FAMILY MEDICINE
Payer: COMMERCIAL

## 2020-02-10 ENCOUNTER — HOSPITAL ENCOUNTER (OUTPATIENT)
Dept: SPEECH THERAPY | Facility: OTHER | Age: 75
Setting detail: THERAPIES SERIES
End: 2020-02-10
Attending: FAMILY MEDICINE
Payer: COMMERCIAL

## 2020-02-10 PROCEDURE — 97130 THER IVNTJ EA ADDL 15 MIN: CPT | Mod: GN

## 2020-02-10 PROCEDURE — 97112 NEUROMUSCULAR REEDUCATION: CPT | Mod: GP,CQ

## 2020-02-10 PROCEDURE — 97110 THERAPEUTIC EXERCISES: CPT | Mod: GP,CQ

## 2020-02-10 PROCEDURE — 97129 THER IVNTJ 1ST 15 MIN: CPT | Mod: GN

## 2020-02-10 NOTE — PROGRESS NOTES
Outpatient Speech Language Pathology Discharge Note     Patient: Altagracia Johnson  : 1945    Beginning/End Dates of Reporting Period:  2019 to 2/10/2020    Referring Provider: Delio Young Diagnosis: mild cognitive deficits     Client Self Report: (P) Altagracia attending session with her . At this time, Altagracia feels as though she is reaching baseline for cognitive skills. She reports she is able to recall functional information at home with no difficulty. She also feels as though she can attend to tasks for longer periods of time. Altagracia is returning to ADLs she participated in before her stroke including cooking, cleaning, and social events. Altagracia and  feel Altagracia is appropriate for discharge at this time. Segments of CLQT administered for comparison and education to patient and . No further cognitive linguistic needs at this time.    Objective Measurements:   Objective Measures: (P) Objective Measure 1, Objective Measure 2, Objective Measure 3, Objective Measure 4, Objective Measure 5  Objective Measure: (P) Sustained Attention   Details: (P) Goal Met. Altagracia demosntrating sustainted attention to task in distracting enviornment for >10 minutes with minimum SLP supports   Objective Measure: (P) Functional Immediate Recall   Details: (P) Goal Met. Altagracia recalling 90% of verbally presented information  with no time delay. Reaching 100% recall with 2nd repetition of information.   Objective Measure: (P) Deductive Reasoning   Details: (P) Goal Met. Altagracia completing moderatley complex deduction puzzle with 100% accuracy and minimum SLP supports   Objective Measure: (P) Divided Attention   Details: (P) Reports no difficulty with divided attention at home.   Objective Measure: (P) Generative Naming   Details: (P) Goal Met. Patient naming 20 category members x3 with minimum SLP supports. Patient benefits from cues to think of subgroups for divergent naming tasks           Goals:  Goal  Identifier Sustained Attention    Goal Description Altagracia will demonstrate sustained attention by maintaining focus during a task for 5 minutes with minimum cues from current level of 2 minutes.    Target Date 02/17/20   Date Met  01/21/20   Progress: Goal met.      Goal Identifier Functional Immediate Recall    Goal Description Altagracia will recall 90% of relevant details immediately following verbal presentation with modified independence including internal and external memory strategies.    Target Date 02/17/20   Date Met  02/03/20   Progress:Goal met.      Goal Identifier Deductive Reasoning    Goal Description Patient will complete moderately complex visual deductive reasoning puzzles with 90% accuracy and modified independence, extra time.    Target Date 02/17/20   Date Met  02/03/20   Progress:Goal met.      Goal Identifier Divided Attention    Goal Description Patient will demonstrate divided attention by responding to multiple tasks or details within tasks with 90% accuracy and minimum SLP supports.    Target Date 02/17/19   Date Met      Progress:Goal met.      Goal Identifier Generative Naming    Goal Description Patient will name 20 members of a given category from current level of 11 with minimum SLP supports.    Target Date 02/17/20   Date Met  01/21/20   Progress:Goal met.        Progress Toward Goals:   Progress this reporting period: Altagracia has met all cognitive linguistic goals. Patient and  Altagracia is doing well at home and is participating in all ADLs. Altagracia notes she infrequently requires the use of memory strategies. At this time, patient is appropriate for discharge. No further cognitive linguistic concerns.     Plan:  Discharge from therapy.    Discharge:    Reason for Discharge: Patient has met all goals.    Discharge Plan: Patient to continue home program.

## 2020-02-21 ENCOUNTER — HOSPITAL ENCOUNTER (OUTPATIENT)
Dept: PHYSICAL THERAPY | Facility: OTHER | Age: 75
Setting detail: THERAPIES SERIES
End: 2020-02-21
Attending: FAMILY MEDICINE
Payer: COMMERCIAL

## 2020-02-21 ENCOUNTER — HOSPITAL ENCOUNTER (OUTPATIENT)
Dept: OCCUPATIONAL THERAPY | Facility: OTHER | Age: 75
Setting detail: THERAPIES SERIES
End: 2020-02-21
Attending: FAMILY MEDICINE
Payer: COMMERCIAL

## 2020-02-21 PROCEDURE — 97110 THERAPEUTIC EXERCISES: CPT | Mod: GP

## 2020-02-21 PROCEDURE — 97530 THERAPEUTIC ACTIVITIES: CPT | Mod: GO

## 2020-02-21 PROCEDURE — 97112 NEUROMUSCULAR REEDUCATION: CPT | Mod: GP

## 2020-02-21 NOTE — PROGRESS NOTES
[facility]      OUTPATIENT PHYSICAL THERAPY  PLAN OF TREATMENT FOR OUTPATIENT REHABILITATION    Patient's Last Name, First Name, M.I.                YOB: 1945  Altagracia Johnson                        Provider's Name  [facility] Medical Record No.  [id number]                               Onset Date: 11/8/19   Start of Care Date: 12/30/19   Type:     _X_PT   ___OT   ___SLP Medical Diagnosis: CVA                       PT Diagnosis: CVA with weakness, decreased balance, double vision, BPPV      _________________________________________________________________________________  Plan of Treatment:    Frequency/Duration: 10 visits from 12/30/19-2/21/20, plan to continue 1 time per week up to 8 more weeks if continuing to make progress     Goals:  Goal Identifier gait   Goal Description Pt able to ambulate level surfaces without assistive device safely in 8 weeks.    Target Date 02/24/20   Date Met      Progress: extend to 4/20/20     Goal Identifier balance   Goal Description Pt safe with dynamic activities including cleaning and cooking in 8 weeks.    Target Date 02/24/20   Date Met      Progress: extend to 4/20/20     Goal Identifier HEP   Goal Description Pt able to manage residual sx with HEP in 8 weeks.    Target Date 02/24/20    Date Met      Progress: extend to 4/20/20       Progress Toward Goals:   Progress this reporting period: good progress but set back with re-occurring BPPV    Certification date from 12/30/19 to 2/24/20.  Re-certification from 2/24/20- 4/20/20    Steph Vasquez, PT          I CERTIFY THE NEED FOR THESE SERVICES FURNISHED UNDER        THIS PLAN OF TREATMENT AND WHILE UNDER MY CARE .             Physician Signature               Date    X_____________________________________________________                      Referring Provider: Delio Cardona MD

## 2020-02-21 NOTE — PROGRESS NOTES
Outpatient Physical Therapy Progress Note     Patient: Altagracia Johnson  : 1945    Beginning/End Dates of Reporting Period:  19 to 2020    Referring Provider: Dr. Cardona    Therapy Diagnosis: CVA     Client Self Report: Altagracia has been stumbling a little more again in last couple weeks had been getting good, dizziness is back again.  Cane only when she goes out not needed in the house.  Almost tipped over backwards when putting on coat.  Double vision comes and goes, forgot eye patch today.     Goals:  Goal Identifier gait   Goal Description Pt able to ambulate level surfaces without assistive device safely in 8 weeks.    Target Date 20   Date Met      Progress:     Goal Identifier balance   Goal Description Pt safe with dynamic activities including cleaning and cooking in 8 weeks.    Target Date 20   Date Met      Progress:     Goal Identifier HEP   Goal Description Pt able to manage residual sx with HEP in 8 weeks.    Target Date 20   Date Met      Progress:       Progress Toward Goals:   Progress this reporting period: good gains until re-occurring BPPV    Plan:  Continue therapy per current plan of care.  Changes to therapy plan of care: extend goals and episode of care to 20      Discharge:  No

## 2020-02-25 ENCOUNTER — HOSPITAL ENCOUNTER (OUTPATIENT)
Dept: PHYSICAL THERAPY | Facility: OTHER | Age: 75
Setting detail: THERAPIES SERIES
End: 2020-02-25
Attending: FAMILY MEDICINE
Payer: COMMERCIAL

## 2020-02-25 PROCEDURE — 97110 THERAPEUTIC EXERCISES: CPT | Mod: GP

## 2020-02-25 PROCEDURE — 97112 NEUROMUSCULAR REEDUCATION: CPT | Mod: GP

## 2020-03-13 ENCOUNTER — HOSPITAL ENCOUNTER (OUTPATIENT)
Dept: OCCUPATIONAL THERAPY | Facility: OTHER | Age: 75
Setting detail: THERAPIES SERIES
End: 2020-03-13
Attending: PHYSICAL MEDICINE & REHABILITATION
Payer: COMMERCIAL

## 2020-03-13 ENCOUNTER — HOSPITAL ENCOUNTER (OUTPATIENT)
Dept: PHYSICAL THERAPY | Facility: OTHER | Age: 75
Setting detail: THERAPIES SERIES
End: 2020-03-13
Attending: PHYSICAL MEDICINE & REHABILITATION
Payer: COMMERCIAL

## 2020-03-13 PROCEDURE — 97112 NEUROMUSCULAR REEDUCATION: CPT | Mod: GP,CQ

## 2020-03-13 PROCEDURE — 97530 THERAPEUTIC ACTIVITIES: CPT | Mod: GO

## 2020-03-13 PROCEDURE — 97110 THERAPEUTIC EXERCISES: CPT | Mod: GP,CQ

## 2020-06-08 DIAGNOSIS — I61.9 NONTRAUMATIC INTRACEREBRAL HEMORRHAGE, UNSPECIFIED CEREBRAL LOCATION, UNSPECIFIED LATERALITY (H): Primary | ICD-10-CM

## 2020-06-18 ENCOUNTER — HOSPITAL ENCOUNTER (OUTPATIENT)
Dept: PHYSICAL THERAPY | Facility: OTHER | Age: 75
Setting detail: THERAPIES SERIES
End: 2020-06-18
Payer: COMMERCIAL

## 2020-06-18 PROCEDURE — 97161 PT EVAL LOW COMPLEX 20 MIN: CPT | Mod: GP

## 2020-06-18 PROCEDURE — 97112 NEUROMUSCULAR REEDUCATION: CPT | Mod: GP

## 2020-06-19 NOTE — PROGRESS NOTES
06/18/20 1400   Quick Adds   Type of Visit Initial OP PT Evaluation   General Information   Start of Care Date 06/18/20   Referring Physician Kaylen Paulson MD    Orders Evaluate and Treat as Indicated   Order Date 06/08/20   Medical Diagnosis Nontraumatic intracerebral hemorrhage, unspecified cerebral location, unspecified laterality (H) I61.9   Onset of illness/injury or Date of Surgery 05/15/20   Precautions/Limitations no known precautions/limitations   Surgical/Medical history reviewed Yes   Pertinent history of current problem (include personal factors and/or comorbidities that impact the POC) Patient had an intracerebral hemorrhage on 5/15/2020,  She had surgery follow by hospitalization. After hospital discharge she went to a short term rehab in [place].  She has had vision isssues since her CVA in November, 2019. She is currently doing vision therapy in [facility].  She has a neurosurgery follow up in July, 2020.  Patient's , Linden, is able to help answer questions today.  Patient states she has had continued issues with balance. Her  notes that she doesn't always  the left leg high enough. She uses a FWW. She has not fallen since returning home on 6/7/2020. Prior to this most recent hospitalization she was independent using a single point cane. She attributes much of her balance deficits to her vision issues. Currently she is wearing prism glasses, but will still see double at times, such as when she is watching TV.    Current Community Support Family/friend caregiver   Patient role/Employment history Retired   Fall Risk Screen   Fall screen completed by PT   Have you fallen 2 or more times in the past year? Yes   Have you fallen and had an injury in the past year? Yes   Is patient a fall risk? Yes;Department fall risk interventions implemented   Fall screen comments Dizziness and visual changes since her CVA and subsequent bleed and surgery.    Pain   Patient currently in  pain No   Cognitive Status Examination   Orientation person;place   Level of Consciousness alert   Follows Commands and Answers Questions 100% of the time   Personal Safety and Judgment intact   Memory impaired   Range of Motion (ROM)   ROM Comment LE ROM is WFL for hip, knee and ankle bilaterally   Strength   Strength Comments Hip flexion=4-/5 B, Knee extension=5/5, Knee Flexion=5/5B, Ankle DF=5/5B,  Ankle PF= 5/5B    Transfer Skills   Transfer Comments independent with sit to stand, but has tendency to stand with weight on heel    Gait   Gait Comments Uses a FWW for independent ambulation   Balance   Balance Comments Foam and feet together: Eyes open=mild postural sway,  Eyes closed=loss of balance at 15 seconds   Balance Special Tests Coyle Balance   Score out of 56 43/56   Comments tends to keep weight shifted posterior on her heels. able to correct with verbal cues.    Coordination   Coordination Comments LE coordiation appears intact    Planned Therapy Interventions   Planned Therapy Interventions gait training;balance training;ROM;strengthening;neuromuscular re-education;stretching   Clinical Impression   Criteria for Skilled Therapeutic Interventions Met yes, treatment indicated   PT Diagnosis Nontraumatic intracerebral hemorrhage, unspecified cerebral location, unspecified laterality (H) I61.9   Influenced by the following impairments impaired gait, impaired balance    Functional limitations due to impairments walking, household tasks,    Clinical Presentation Stable/Uncomplicated   Clinical Presentation Rationale symptoms are consistent with the diagnosis    Clinical Decision Making (Complexity) Low complexity   Therapy Frequency   (16 visits )   Predicted Duration of Therapy Intervention (days/wks) 8 weeks    Risk & Benefits of therapy have been explained Yes   Patient, Family & other staff in agreement with plan of care Yes   Education Assessment   Preferred Learning Style Listening;Demonstration    Barriers to Learning Cognitive  (memory issues per report of patient and  )   GOALS   PT Eval Goals 1;2;3   Goal 1   Goal Identifier Balance    Goal Description Improve the Coyle Balance test score to 48/56 or higher to demonstrate a decreased fall risk   Target Date 08/14/20   Goal 2   Goal Identifier Gait   Goal Description Improve walking to allow for safe and independent use of a single point cane for community ambulation   Target Date 08/14/20   Goal 3   Goal Identifier HEP    Goal Description Develop independent HEP with assist from  as needed   Target Date 08/14/20   Total Evaluation Time   PT Eval, Low Complexity Minutes (19726) 45   Therapy Certification   Certification date from 06/18/20   Certification date to 08/14/20   Medical Diagnosis Nontraumatic intracerebral hemorrhage, unspecified cerebral location, unspecified laterality (H) I61.9   Certification I certify the need for these services furnished under this plan of treatment and while under my care.  (Physician co-signature of this document indicates review and certification of the therapy plan).

## 2020-06-19 NOTE — PROGRESS NOTES
[facility]        OUTPATIENT PHYSICAL THERAPY FUNCTIONAL EVALUATION  PLAN OF TREATMENT FOR OUTPATIENT REHABILITATION  (COMPLETE FOR INITIAL CLAIMS ONLY)  Patient's Last Name, First Name, M.I.  YOB: 1945  Altagracia Johnson     Provider's Name   [facility]   Medical Record No.  [id number]     Start of Care Date:  06/18/20   Onset Date:  05/15/20   Type:     _X__PT   ____OT  ____SLP Medical Diagnosis:  (P) Nontraumatic intracerebral hemorrhage, unspecified cerebral location, unspecified laterality (H) I61.9     PT Diagnosis:  Nontraumatic intracerebral hemorrhage, unspecified cerebral location, unspecified laterality (H) I61.9 Visits from SOC:  1                              __________________________________________________________________________________  Plan of Treatment/Functional Goals:  gait training, balance training, ROM, strengthening, neuromuscular re-education, stretching           GOALS  Balance   (P) Improve the Coyle Balance test score to 48/56 or higher to demonstrate a decreased fall risk  (P) 08/14/20    (P) Gait  (P) Improve walking to allow for safe and independent use of a single point cane for community ambulation  (P) 08/14/20    (P) HEP   (P) Develop independent HEP with assist from  as needed  (P) 08/14/20                                                           Therapy Frequency:  (16 visits )   Predicted Duration of Therapy Intervention:  8 weeks     Jonnathan Mcintyre, PT                                    I CERTIFY THE NEED FOR THESE SERVICES FURNISHED UNDER        THIS PLAN OF TREATMENT AND WHILE UNDER MY CARE .             Physician Signature               Date    X_____________________________________________________                      Certification Date From:  (P) 06/18/20   Certification Date To:  (P) 08/14/20    Referring Provider:   Kaylen Paulson MD     Initial Assessment  See Epic Evaluation- Start of Care Date: 06/18/20

## 2020-06-24 ENCOUNTER — HOSPITAL ENCOUNTER (OUTPATIENT)
Dept: PHYSICAL THERAPY | Facility: OTHER | Age: 75
Setting detail: THERAPIES SERIES
End: 2020-06-24
Payer: COMMERCIAL

## 2020-06-24 PROCEDURE — 97112 NEUROMUSCULAR REEDUCATION: CPT | Mod: GP,CQ

## 2020-06-24 PROCEDURE — 97110 THERAPEUTIC EXERCISES: CPT | Mod: GP,CQ

## 2020-07-01 ENCOUNTER — HOSPITAL ENCOUNTER (OUTPATIENT)
Dept: PHYSICAL THERAPY | Facility: OTHER | Age: 75
Setting detail: THERAPIES SERIES
End: 2020-07-01
Payer: COMMERCIAL

## 2020-07-01 PROCEDURE — 97112 NEUROMUSCULAR REEDUCATION: CPT | Mod: GP

## 2020-07-01 PROCEDURE — 97110 THERAPEUTIC EXERCISES: CPT | Mod: GP

## 2020-07-02 NOTE — PROGRESS NOTES
Outpatient Physical Therapy Discharge Note     Patient: Altagracia Johnson  : 1945    Beginning/End Dates of Reporting Period:  19 to 2020    Referring Provider: Delio Cardona MD    Therapy Diagnosis: CVA     Client Self Report: Stent and coils placed last week - doing well. Altagracia will see surgeon on .     PT Note: Altagracia was feeling ok with therapy, she had come for 12 visits and decided to continue at home with Covid shut down.        Goals:  Goal Identifier gait   Goal Description Pt able to ambulate level surfaces without assistive device safely in 8 weeks.    Target Date 20   Date Met  20   Progress:     Goal Identifier balance   Goal Description Pt safe with dynamic activities including cleaning and cooking in 8 weeks.    Target Date 20   Date Met      Progress:     Goal Identifier HEP   Goal Description Pt able to manage residual sx with HEP in 8 weeks.    Target Date 20   Date Met      Progress:       Progress Toward Goals:   Progress this reporting period: improved balance and safety with gait    Plan:  Discharge from therapy.    Discharge:    Reason for Discharge: Patient chooses to discontinue therapy with Covid 19.    Equipment Issued: na    Discharge Plan: Patient to continue home program.

## 2020-07-03 ENCOUNTER — HOSPITAL ENCOUNTER (OUTPATIENT)
Dept: PHYSICAL THERAPY | Facility: OTHER | Age: 75
Setting detail: THERAPIES SERIES
End: 2020-07-03
Payer: COMMERCIAL

## 2020-07-03 PROCEDURE — 97110 THERAPEUTIC EXERCISES: CPT | Mod: GP

## 2020-07-03 PROCEDURE — 97112 NEUROMUSCULAR REEDUCATION: CPT | Mod: GP

## 2020-07-06 ENCOUNTER — HOSPITAL ENCOUNTER (OUTPATIENT)
Dept: PHYSICAL THERAPY | Facility: OTHER | Age: 75
Setting detail: THERAPIES SERIES
End: 2020-07-06
Attending: PHYSICAL THERAPIST
Payer: COMMERCIAL

## 2020-07-06 PROCEDURE — 97110 THERAPEUTIC EXERCISES: CPT | Mod: GP

## 2020-07-06 PROCEDURE — 97112 NEUROMUSCULAR REEDUCATION: CPT | Mod: GP

## 2020-07-09 ENCOUNTER — HOSPITAL ENCOUNTER (OUTPATIENT)
Dept: PHYSICAL THERAPY | Facility: OTHER | Age: 75
Setting detail: THERAPIES SERIES
End: 2020-07-09
Attending: PHYSICAL THERAPIST
Payer: COMMERCIAL

## 2020-07-09 PROCEDURE — 97110 THERAPEUTIC EXERCISES: CPT | Mod: GP

## 2020-07-09 PROCEDURE — 97112 NEUROMUSCULAR REEDUCATION: CPT | Mod: GP

## 2020-07-24 ENCOUNTER — RESULTS ONLY (OUTPATIENT)
Dept: LAB | Age: 75
End: 2020-07-24

## 2020-07-24 ENCOUNTER — MEDICAL CORRESPONDENCE (OUTPATIENT)
Dept: HEALTH INFORMATION MANAGEMENT | Facility: OTHER | Age: 75
End: 2020-07-24

## 2020-07-24 LAB
ANION GAP SERPL CALCULATED.3IONS-SCNC: 7 MMOL/L (ref 3–14)
BUN SERPL-MCNC: 12 MG/DL (ref 7–25)
CALCIUM SERPL-MCNC: 9.7 MG/DL (ref 8.6–10.3)
CHLORIDE SERPL-SCNC: 105 MMOL/L (ref 98–107)
CO2 SERPL-SCNC: 27 MMOL/L (ref 21–31)
CREAT SERPL-MCNC: 0.8 MG/DL (ref 0.6–1.2)
GFR SERPL CREATININE-BSD FRML MDRD: 71 ML/MIN/{1.73_M2}
GLUCOSE SERPL-MCNC: 157 MG/DL (ref 70–105)
POTASSIUM SERPL-SCNC: 4 MMOL/L (ref 3.5–5.1)
SODIUM SERPL-SCNC: 139 MMOL/L (ref 134–144)

## 2020-07-29 ENCOUNTER — NURSING HOME VISIT (OUTPATIENT)
Dept: GERIATRICS | Facility: OTHER | Age: 75
End: 2020-07-29
Attending: NURSE PRACTITIONER
Payer: COMMERCIAL

## 2020-07-29 DIAGNOSIS — I10 ESSENTIAL HYPERTENSION: Primary | ICD-10-CM

## 2020-07-29 PROCEDURE — 99315 NF DSCHRG MGMT 30 MIN/LESS: CPT | Performed by: NURSE PRACTITIONER

## 2020-07-30 ENCOUNTER — RESULTS ONLY (OUTPATIENT)
Dept: LAB | Age: 75
End: 2020-07-30

## 2020-07-30 ENCOUNTER — MEDICAL CORRESPONDENCE (OUTPATIENT)
Dept: HEALTH INFORMATION MANAGEMENT | Facility: OTHER | Age: 75
End: 2020-07-30

## 2020-07-30 LAB
BASOPHILS # BLD AUTO: 0 10E9/L (ref 0–0.2)
BASOPHILS NFR BLD AUTO: 0.3 %
DIFFERENTIAL METHOD BLD: ABNORMAL
EOSINOPHIL # BLD AUTO: 0.1 10E9/L (ref 0–0.7)
EOSINOPHIL NFR BLD AUTO: 1.4 %
ERYTHROCYTE [DISTWIDTH] IN BLOOD BY AUTOMATED COUNT: 13.7 % (ref 10–15)
HCT VFR BLD AUTO: 33.7 % (ref 35–47)
HGB BLD-MCNC: 10.8 G/DL (ref 11.7–15.7)
IMM GRANULOCYTES # BLD: 0 10E9/L (ref 0–0.4)
IMM GRANULOCYTES NFR BLD: 0.3 %
LYMPHOCYTES # BLD AUTO: 1.4 10E9/L (ref 0.8–5.3)
LYMPHOCYTES NFR BLD AUTO: 23.8 %
MCH RBC QN AUTO: 29.7 PG (ref 26.5–33)
MCHC RBC AUTO-ENTMCNC: 32 G/DL (ref 31.5–36.5)
MCV RBC AUTO: 93 FL (ref 78–100)
MONOCYTES # BLD AUTO: 0.4 10E9/L (ref 0–1.3)
MONOCYTES NFR BLD AUTO: 7 %
NEUTROPHILS # BLD AUTO: 3.9 10E9/L (ref 1.6–8.3)
NEUTROPHILS NFR BLD AUTO: 67.2 %
PLATELET # BLD AUTO: 324 10E9/L (ref 150–450)
RBC # BLD AUTO: 3.64 10E12/L (ref 3.8–5.2)
WBC # BLD AUTO: 5.8 10E9/L (ref 4–11)

## 2020-07-31 NOTE — PROGRESS NOTES
Visit Date:   07/29/2020      CHIEF COMPLAINT:  Discharge evaluation.      HISTORY OF PRESENT ILLNESS:  The patient was admitted to [facility] on 07/22 from [facility].  She had a hospital admission from 07/17 through the 22nd for L2 compression fracture.  She was using her cane and walking up stairs when she fell backwards.  She fell down 3 stairs.  While at [facility], she was evaluated by neurosurgeon who did not feel that TLSO was necessary nor was she surgical candidate.  While there, she did develop urinary retention.  She was found to have a urinary tract infection.  She was treated with a 3-day course of cefdinir.  She states she is voiding without difficulty.  Postvoid residual today was 87.  She has no dysuria or hematuria.  Overall, she is feeling well.  She has been working with PT and OT and is getting stronger.  She is able to ambulate with the use of a walker.  She uses a wheelchair at times.  For her pain, she does take oxycodone 10 mg, but is only using this about once or twice daily.  It is scheduled every 6 hours as needed.  While hospitalized, her hemoglobin was down to 9.9 grams per deciliter.  She is not having any evidence of blood loss was reviewed and discussed with the patient today.  Her basic metabolic panel was recently drawn on 07/24 and this was stable.  By reviewing previous labs, hemoglobin on 06/04 was 11.7 grams per deciliter and on 07/17 was 9.9 g/dL.      PAST MEDICAL HISTORY, PAST SURGICAL HISTORY, ALLERGIES, MEDICATIONS, RISK FACTORS, SOCIAL HISTORY:  All reviewed.      ADVANCE DIRECTIVE:  Full code.      REVIEW OF SYSTEMS:  A 12-point complete review of systems discussed with nursing staff and resident.  See HPI for positive findings, otherwise unremarkable.      PHYSICAL EXAMINATION:   GENERAL:  Pleasant female in no acute distress.  Affect normal.  Alert and oriented x 4.  She is in good spirits.   VITAL SIGNS:  Blood pressure 139/84, pulse 76,  respiratory rate 20, temperature 97.7, weight 191 pounds.   SKIN:  Color pink.   HEENT:  Sclerae nonicteric.  Mucous membranes moist.   NECK:  Supple without adenopathy.  No thyromegaly.   LUNGS:  Fields clear to auscultation throughout.   CARDIOVASCULAR:  Regular rate and rhythm.   ABDOMEN:  Soft and without masses, tenderness or organomegaly.   EXTREMITIES:  With trace bilateral edema.      ASSESSMENT:   1.  L2 compression fracture.   2.  Urinary tract infection.   3.  Urinary retention.   4.  Hypertension.   5.  Restless leg syndrome.      PLAN:  I think it is fine for her to go ahead and discharge home with home care, physical therapy.  She will discharge with current medication and treatment plan.  We will have a CBC checked tomorrow morning to followup on her anemia.  She also will schedule a followup hospitalization with her primary physician within 1 week of nursing home discharge.         ELIZABETH VELIZ NP             D: 2020   T: 2020   MT: CURT      Name:     TONA SMALL   MRN:      [id number]        Account:      [id number]   :      1945           Visit Date:   2020      Document: [id number]       cc: [facility]

## 2020-08-03 ENCOUNTER — HOSPITAL ENCOUNTER (OUTPATIENT)
Dept: PHYSICAL THERAPY | Facility: OTHER | Age: 75
Setting detail: THERAPIES SERIES
End: 2020-08-03
Attending: PHYSICAL THERAPIST
Payer: COMMERCIAL

## 2020-08-03 PROCEDURE — 97112 NEUROMUSCULAR REEDUCATION: CPT | Mod: GP

## 2020-08-03 PROCEDURE — 97164 PT RE-EVAL EST PLAN CARE: CPT | Mod: GP

## 2020-08-04 NOTE — PROGRESS NOTES
[facility]      OUTPATIENT PHYSICAL THERAPY  PLAN OF TREATMENT FOR OUTPATIENT REHABILITATION    Patient's Last Name, First Name, M.I.                YOB: 1945  Altagracia Johnson                        Provider's Name  [facility] Medical Record No.  [id number]                               Onset Date: 5/15/2020   Start of Care Date: 6/18/2020   Type:     _X_PT   ___OT   ___SLP Medical Diagnosis:  Nontraumatic intracerebral hemorrhage, unspecified cerebral location, unspecified laterality (H) I61.9, Recent fall with injury to the back                        PT Diagnosis:  Nontraumatic intracerebral hemorrhage, unspecified cerebral location, unspecified laterality (H) I61.9, Recent fall with injury to the back       _________________________________________________________________________________  Plan of Treatment:    Frequency/Duration: 16 visits x 8 weeks     Goals:  Goal Identifier  Balance    Goal Description  Improve the Coyle Balance test score to 48/56 or higher to demonstrate a decreased fall risk   Target Date  9/28/2020   Date Met      Progress:      Goal Identifier  Gait    Goal Description  Improve walking to allow for safe and independent use of a single point cane for community ambulation   Target Date  9/28/2020   Date Met      Progress:      Goal Identifier  Pain    Goal Description  Report back pain at 2/10 or less to allow standing at sink to wash dishes without difficulty    Target Date  9/28/2020   Date Met      Progress:       Certification date from 8/3/2020 to 9/28/2020.    Jonnathan Mcintyre, PT          I CERTIFY THE NEED FOR THESE SERVICES FURNISHED UNDER        THIS PLAN OF TREATMENT AND WHILE UNDER MY CARE .             Physician Signature               Date    X_____________________________________________________                      Referring Provider:   Wendy

## 2020-08-04 NOTE — PROGRESS NOTES
Outpatient Physical Therapy Progress Note/ Re-Evaluation      Patient: Altagracia Johnson  : 1945    Beginning/End Dates of Reporting Period:  2020 to 2020    Referring Provider: Dr. Sebastian     Therapy Diagnosis: Nontraumatic intracerebral hemorrhage, unspecified cerebral location, unspecified laterality (H) I61.9, Recent fall with injury to the back      Client Self Report: Patient has had a few medical events since the last PT visit.  She had been to [place] on 2020 for a follow up angiogram to assess for any bleeding in the brain. That procedure was negative. On 2020, she fell backward on stairs. She was sent to [facility] due concerns with a L2 lumbar compression fractue. Per patient and her , it was determined that she did not need a TLSO brace. She was given a soft elastic lumbar support.  Patient states she has not worn it much since returning home.  She was in [facility] from -. She was transferred to the [facility] in [place] for continued care.  While she was at [facility], she did PT.  She was dicharged to home on 2020.    Patient states she continues to have increased mid to low back pain.  Pain is rated as 5/10 at rest. Pain will increased to a 10/10 about threes times a week on average.  States standing activity such as washing dishes increases pain to a 10/10.  She has is now using a walker more consistently since the fall, but she has been using the cane some at home too. Her , Linden, reports that she still will forget the cane at times.  She has intermittent dizzy spells.  She feels her balance has declined some since the fall.    Objective Measurements:  Objective Measure: LE strength  Details: Hip flexion:R=4-/5, L=4/5,   Knee Extension:R=4+/5, L=4+/5  ,   Knee flexion:R=5/5 ,L=5/5,     Ankle Dorsiflexion:R=5/5 ,L=5/5,    Ankle Plantarflexion:R=5/5, L=5/5    Objective Measure: Balance   Details:   Decreased balance reaction with weight shifting  forward/back and side/side,   Decreased balance when standing on one leg and tapping the opposite foot foward and to the side,     Objective Measure: Gait  Details: Amulates with a FWW--cues for proper use when turning corners    Goals:  Goal Identifier  Balance    Goal Description  Improve the Coyle Balance test score to 48/56 or higher to demonstrate a decreased fall risk   Target Date  9/28/2020   Date Met      Progress:     Goal Identifier  Gait    Goal Description  Improve walking to allow for safe and independent use of a single point cane for community ambulation   Target Date  9/28/2020   Date Met      Progress:     Goal Identifier  Pain    Goal Description  Report back pain at 2/10 or less to allow standing at sink to wash dishes without difficulty    Target Date  9/28/2020   Date Met      Progress:     Progress Toward Goals:   Progress this reporting period: Patient had a recent setback due to a fall resulting in a hospitalization and short stay at an SNF. She has returned to home, but has noted a decline in her gait and balance. She will benefit from continued PT interventions to improve gait, balance and strength.       Plan:  Continue therapy per current plan of care.

## 2020-08-06 ENCOUNTER — HOSPITAL ENCOUNTER (OUTPATIENT)
Dept: PHYSICAL THERAPY | Facility: OTHER | Age: 75
Setting detail: THERAPIES SERIES
End: 2020-08-06
Attending: PHYSICAL THERAPIST
Payer: COMMERCIAL

## 2020-08-06 PROCEDURE — 97110 THERAPEUTIC EXERCISES: CPT | Mod: GP

## 2020-08-06 PROCEDURE — 97112 NEUROMUSCULAR REEDUCATION: CPT | Mod: GP

## 2020-08-12 ENCOUNTER — HOSPITAL ENCOUNTER (OUTPATIENT)
Dept: PHYSICAL THERAPY | Facility: OTHER | Age: 75
Setting detail: THERAPIES SERIES
End: 2020-08-12
Attending: PHYSICAL THERAPIST
Payer: COMMERCIAL

## 2020-08-12 PROCEDURE — 97112 NEUROMUSCULAR REEDUCATION: CPT | Mod: GP,CQ

## 2020-08-12 PROCEDURE — 97110 THERAPEUTIC EXERCISES: CPT | Mod: GP,CQ

## 2020-08-19 ENCOUNTER — HOSPITAL ENCOUNTER (OUTPATIENT)
Dept: PHYSICAL THERAPY | Facility: OTHER | Age: 75
Setting detail: THERAPIES SERIES
End: 2020-08-19
Attending: PHYSICAL THERAPIST
Payer: COMMERCIAL

## 2020-08-19 PROCEDURE — 97110 THERAPEUTIC EXERCISES: CPT | Mod: GP

## 2020-08-19 PROCEDURE — 97112 NEUROMUSCULAR REEDUCATION: CPT | Mod: GP

## 2020-08-21 ENCOUNTER — HOSPITAL ENCOUNTER (OUTPATIENT)
Dept: PHYSICAL THERAPY | Facility: OTHER | Age: 75
Setting detail: THERAPIES SERIES
End: 2020-08-21
Attending: PHYSICAL THERAPIST
Payer: COMMERCIAL

## 2020-08-21 PROCEDURE — 97110 THERAPEUTIC EXERCISES: CPT | Mod: GP

## 2020-08-24 ENCOUNTER — HOSPITAL ENCOUNTER (OUTPATIENT)
Dept: PHYSICAL THERAPY | Facility: OTHER | Age: 75
Setting detail: THERAPIES SERIES
End: 2020-08-24
Attending: PHYSICAL THERAPIST
Payer: COMMERCIAL

## 2020-08-24 PROCEDURE — 97110 THERAPEUTIC EXERCISES: CPT | Mod: GP

## 2020-08-24 PROCEDURE — 97112 NEUROMUSCULAR REEDUCATION: CPT | Mod: GP

## 2020-08-27 ENCOUNTER — HOSPITAL ENCOUNTER (OUTPATIENT)
Dept: PHYSICAL THERAPY | Facility: OTHER | Age: 75
Setting detail: THERAPIES SERIES
End: 2020-08-27
Attending: PHYSICAL THERAPIST
Payer: COMMERCIAL

## 2020-08-27 PROCEDURE — 97112 NEUROMUSCULAR REEDUCATION: CPT | Mod: GP

## 2020-08-31 ENCOUNTER — HOSPITAL ENCOUNTER (OUTPATIENT)
Dept: PHYSICAL THERAPY | Facility: OTHER | Age: 75
Setting detail: THERAPIES SERIES
End: 2020-08-31
Attending: PHYSICAL THERAPIST
Payer: COMMERCIAL

## 2020-08-31 PROCEDURE — 97110 THERAPEUTIC EXERCISES: CPT | Mod: GP

## 2020-08-31 PROCEDURE — 97112 NEUROMUSCULAR REEDUCATION: CPT | Mod: GP

## 2020-09-04 ENCOUNTER — HOSPITAL ENCOUNTER (OUTPATIENT)
Dept: PHYSICAL THERAPY | Facility: OTHER | Age: 75
Setting detail: THERAPIES SERIES
End: 2020-09-04
Attending: PHYSICAL THERAPIST
Payer: COMMERCIAL

## 2020-09-04 PROCEDURE — 97112 NEUROMUSCULAR REEDUCATION: CPT | Mod: GP

## 2020-09-04 PROCEDURE — 97110 THERAPEUTIC EXERCISES: CPT | Mod: GP

## 2020-09-10 ENCOUNTER — HOSPITAL ENCOUNTER (OUTPATIENT)
Dept: PHYSICAL THERAPY | Facility: OTHER | Age: 75
Setting detail: THERAPIES SERIES
End: 2020-09-10
Attending: PHYSICAL THERAPIST
Payer: COMMERCIAL

## 2020-09-10 PROCEDURE — 97112 NEUROMUSCULAR REEDUCATION: CPT | Mod: GP

## 2020-09-10 PROCEDURE — 97110 THERAPEUTIC EXERCISES: CPT | Mod: GP

## 2020-09-14 ENCOUNTER — HOSPITAL ENCOUNTER (OUTPATIENT)
Dept: PHYSICAL THERAPY | Facility: OTHER | Age: 75
Setting detail: THERAPIES SERIES
End: 2020-09-14
Attending: PHYSICAL THERAPIST
Payer: COMMERCIAL

## 2020-09-14 PROCEDURE — 97110 THERAPEUTIC EXERCISES: CPT | Mod: GP

## 2020-09-14 PROCEDURE — 97112 NEUROMUSCULAR REEDUCATION: CPT | Mod: GP

## 2020-09-15 NOTE — PROGRESS NOTES
Outpatient Physical Therapy Progress Note     Patient: Altagracia Johnson  : 1945    Beginning/End Dates of Reporting Period:  20 to 9/15/2020    Referring Provider: Dr. Kaylen Paulson/ Dr. Chente Nguyen    Therapy Diagnosis: Nontraumatic intracerebral hemorrhage, unspecified cerebral location, unspecified laterality (H) I61.9     Client Self Report: Altagracia was tired over the weekend. Still been walking down to the mailbox with cane, nothing around house.  said she hasn't complained about vertigo lately.  Still c/o LBP.       Goals:  Goal Identifier Balance    Goal Description Improve the Coyle Balance test score to 48/56 or higher to demonstrate a decreased fall risk   Target Date 20   Date Met      Progress:      Goal Identifier Gait   Goal Description Improve walking to allow for safe and independent use of a single point cane for community ambulation   Target Date 20   Date Met      Progress:     Goal Identifier Pain   Goal Description Report back pain at 2/10 or less to allow standing at sink to wash dishes without difficulty    Target Date 20   Date Met      Progress:       Progress Toward Goals:   Progress this reporting period: good progress in balance and safety, walking more with cane or no assistive device    Plan:  Continue therapy per current plan of care.    Discharge:  No

## 2020-09-18 ENCOUNTER — HOSPITAL ENCOUNTER (OUTPATIENT)
Dept: PHYSICAL THERAPY | Facility: OTHER | Age: 75
Setting detail: THERAPIES SERIES
End: 2020-09-18
Attending: PHYSICAL THERAPIST
Payer: COMMERCIAL

## 2020-09-18 PROCEDURE — 97110 THERAPEUTIC EXERCISES: CPT | Mod: GP

## 2020-09-18 PROCEDURE — 97112 NEUROMUSCULAR REEDUCATION: CPT | Mod: GP

## 2020-09-24 ENCOUNTER — HOSPITAL ENCOUNTER (OUTPATIENT)
Dept: PHYSICAL THERAPY | Facility: OTHER | Age: 75
Setting detail: THERAPIES SERIES
End: 2020-09-24
Attending: PHYSICAL THERAPIST
Payer: COMMERCIAL

## 2020-09-24 PROCEDURE — 97110 THERAPEUTIC EXERCISES: CPT | Mod: GP

## 2020-09-24 PROCEDURE — 97112 NEUROMUSCULAR REEDUCATION: CPT | Mod: GP

## 2020-10-01 ENCOUNTER — HOSPITAL ENCOUNTER (OUTPATIENT)
Dept: PHYSICAL THERAPY | Facility: OTHER | Age: 75
Setting detail: THERAPIES SERIES
End: 2020-10-01
Attending: PHYSICAL THERAPIST
Payer: COMMERCIAL

## 2020-10-01 PROCEDURE — 97110 THERAPEUTIC EXERCISES: CPT | Mod: GP

## 2020-10-01 PROCEDURE — 97112 NEUROMUSCULAR REEDUCATION: CPT | Mod: GP

## 2020-10-09 ENCOUNTER — HOSPITAL ENCOUNTER (OUTPATIENT)
Dept: PHYSICAL THERAPY | Facility: OTHER | Age: 75
Setting detail: THERAPIES SERIES
End: 2020-10-09
Attending: PHYSICAL THERAPIST
Payer: COMMERCIAL

## 2020-10-09 PROCEDURE — 97110 THERAPEUTIC EXERCISES: CPT | Mod: GP

## 2020-10-22 ENCOUNTER — HOSPITAL ENCOUNTER (OUTPATIENT)
Dept: PHYSICAL THERAPY | Facility: OTHER | Age: 75
Setting detail: THERAPIES SERIES
End: 2020-10-22
Attending: PHYSICAL THERAPIST
Payer: COMMERCIAL

## 2020-10-22 PROCEDURE — 97110 THERAPEUTIC EXERCISES: CPT | Mod: GP

## 2020-10-22 NOTE — PROGRESS NOTES
Outpatient Physical Therapy Discharge Note     Patient: Altagracia Johnson  : 1945    Beginning/End Dates of Reporting Period:  2020 to 10/22/2020    Referring Provider: Dr. Kaylen Paulson/ Dr. Chente Nguyen     Therapy Diagnosis: Nontraumatic intracerebral hemorrhage, unspecified cerebral location, unspecified laterality (H) I61.9     Client Self Report: Patient reports she is ready to proceed with independent HEP and management. She reports compliance with the current HEP.     Objective Measurements:  Objective Measure: LE strength  Details: Hip flexion:R=4/5, L=4/5,  Knee Extension:R=5/5, L=5/5  ,   Knee flexion:R=5/5 ,L=5/5,     Ankle Dorsiflexion:R=5/5 ,L=5/5,     Ankle plantarflexion:R=5/5, L=5/5       Objective Measure: Gait  Details: Ambulates with cane at this time.        Goals:  Goal Identifier Balance    Goal Description Improve the Coyle Balance test score to 48/56 or higher to demonstrate a decreased fall risk   Target Date 20   Date Met  10/01/20   Progress: Goal Met      Goal Identifier Gait   Goal Description Improve walking to allow for safe and independent use of a single point cane for community ambulation   Target Date 20   Date Met  10/01/20   Progress: Goal Met      Goal Identifier Pain   Goal Description Report back pain at 2/10 or less to allow standing at sink to wash dishes without difficulty    Target Date 20   Date Met      Progress: Progressing at time of discharge        Progress Toward Goals:   Progress this reporting period: Patient is ready to proceed with independent HEP and management.     Plan:  Discharge from therapy.    Discharge:    Reason for Discharge: Patient making good to progress to completion of all goals. She is ready to proceed with independent HEP and management.       Discharge Plan: Patient to continue home program.

## 2021-07-06 DIAGNOSIS — S72.22XD CLOSED DISPLACED SUBTROCHANTERIC FRACTURE OF LEFT FEMUR WITH ROUTINE HEALING: Primary | ICD-10-CM

## 2021-07-26 ENCOUNTER — HOSPITAL ENCOUNTER (OUTPATIENT)
Dept: PHYSICAL THERAPY | Facility: OTHER | Age: 76
Setting detail: THERAPIES SERIES
End: 2021-07-26
Attending: FAMILY MEDICINE
Payer: COMMERCIAL

## 2021-07-26 PROCEDURE — 97161 PT EVAL LOW COMPLEX 20 MIN: CPT | Mod: GP

## 2021-07-26 PROCEDURE — 97110 THERAPEUTIC EXERCISES: CPT | Mod: GP

## 2021-07-26 NOTE — PROGRESS NOTES
[facility]          OUTPATIENT PHYSICAL THERAPY ORTHOPEDIC EVALUATION  PLAN OF TREATMENT FOR OUTPATIENT REHABILITATION  (COMPLETE FOR INITIAL CLAIMS ONLY)  Patient's Last Name, First Name, M.I.  YOB: 1945  Altagracia Johnson    Provider s Name:  [facility]   Medical Record No.  [id number]   Start of Care Date:  07/26/21   Onset Date:  05/14/21   Type:     _X__PT   ___OT   ___SLP Medical Diagnosis:  (P) S72.22XD     PT Diagnosis:  (P) decline in functional mobility, generalized LE weakness/instability.    Visits from SOC:  1      _________________________________________________________________________________  Plan of Treatment/Functional Goals:   gait training, joint mobilization, balance training, manual therapy, motor coordination training, neuromuscular re-education, ROM, strengthening, stretching, transfer training    Cryotherapy, Hot packs, Ultrasound  vasopneumatic compression    Goals  Goal Identifier: balance  Goal Description:  pt will complete B single leg stance for 3 sec without UE assist in order to improve stability for gait with SEC.   Target Date: 09/06/21    Goal Identifier: Strength  Goal Description: Pt will complete 4 sit<>stands from average height chair without UE assist in 30 sec to progress functional stability for gait and transfers.   Target Date: 09/20/21    Goal Identifier:  stairs  Goal Description:  Pt will ascend/descend 12 stairs with railing and SEC with SBA to progress towards ability to get in/out of home safely.   Target Date:  09/06/21    Goal Identifier: Gait  Goal Description: Pt will ambulate with SEC independently across 200 feet of outdoor terrain to progress towards accessing the community and returning to prior function.   Target Date: 10/04/21    Therapy Frequency:  other (see comments)  Predicted Duration of Therapy Intervention:  1-2x/week x 10 weeks    Lalita Card PT                 I  CERTIFY THE NEED FOR THESE SERVICES FURNISHED UNDER        THIS PLAN OF TREATMENT AND WHILE UNDER MY CARE .             Physician Signature               Date    X_____________________________________________________                             Certification Date From:   07/26/21   Certification Date To:   10/04/21    Referring Provider:  Dr. Nguyen    Initial Assessment        See Epic Evaluation Start of Care Date: 07/26/21

## 2021-07-29 ENCOUNTER — HOSPITAL ENCOUNTER (OUTPATIENT)
Dept: PHYSICAL THERAPY | Facility: OTHER | Age: 76
Setting detail: THERAPIES SERIES
End: 2021-07-29
Attending: FAMILY MEDICINE
Payer: COMMERCIAL

## 2021-07-29 PROCEDURE — 97116 GAIT TRAINING THERAPY: CPT | Mod: GP | Performed by: PHYSICAL THERAPIST

## 2021-07-29 PROCEDURE — 97110 THERAPEUTIC EXERCISES: CPT | Mod: GP | Performed by: PHYSICAL THERAPIST

## 2021-07-30 DIAGNOSIS — H81.10 BPV (BENIGN POSITIONAL VERTIGO): Primary | ICD-10-CM

## 2021-08-06 ENCOUNTER — HOSPITAL ENCOUNTER (OUTPATIENT)
Dept: PHYSICAL THERAPY | Facility: OTHER | Age: 76
Setting detail: THERAPIES SERIES
End: 2021-08-06
Attending: FAMILY MEDICINE
Payer: COMMERCIAL

## 2021-08-06 DIAGNOSIS — H81.10 BPV (BENIGN POSITIONAL VERTIGO): ICD-10-CM

## 2021-08-06 PROCEDURE — 97161 PT EVAL LOW COMPLEX 20 MIN: CPT | Mod: GP | Performed by: PHYSICAL THERAPIST

## 2021-08-06 PROCEDURE — 95992 CANALITH REPOSITIONING PROC: CPT | Mod: GP | Performed by: PHYSICAL THERAPIST

## 2021-08-06 NOTE — PROGRESS NOTES
[facility]        OUTPATIENT PHYSICAL THERAPY FUNCTIONAL EVALUATION  PLAN OF TREATMENT FOR OUTPATIENT REHABILITATION  (COMPLETE FOR INITIAL CLAIMS ONLY)  Patient's Last Name, First Name, M.I.  YOB: 1945  Altagracia Johnson     Provider's Name   [facility]   Medical Record No.  [id number]     Start of Care Date:  08/06/21   Onset Date:   6/15/21   Type:     _X__PT   ____OT  ____SLP Medical Diagnosis:  BPV     PT Diagnosis:  impaired mobility Visits from SOC:  1                              __________________________________________________________________________________  Plan of Treatment/Functional Goals:  balance training, neuromuscular re-education, transfer training, visual perception (vestibular therapy)           GOALS  transfers  Patient will be able to complete supine-sit transfers with no limitation due to feeling of dizziness for safe bed mobility.  09/03/21    rolling  Patient will be able to roll R/L with no limitation due to dizziness for improved safety with bed mobility.  09/03/21             Therapy Frequency:   (4 visits)   Predicted Duration of Therapy Intervention:  4 weeks    Leigha Weeks, PT                                    I CERTIFY THE NEED FOR THESE SERVICES FURNISHED UNDER        THIS PLAN OF TREATMENT AND WHILE UNDER MY CARE .             Physician Signature               Date    X_____________________________________________________                      Certification Date From:  08/06/21   Certification Date To:  09/03/21    Referring Provider:  Mike Harris PA-C    Initial Assessment  See Epic Evaluation- Start of Care Date: 08/06/21

## 2021-08-06 NOTE — PROGRESS NOTES
08/06/21 1100   Quick Adds   Quick Adds Certification;Vestibular Eval   Type of Visit Initial OP PT Evaluation   General Information   Start of Care Date 08/06/21   Referring Physician Mike Harris PA-C   Orders Evaluate and Treat as Indicated   Order Date 07/29/21   Medical Diagnosis BPV   Precautions/Limitations no known precautions/limitations   Surgical/Medical history reviewed Yes   Pertinent history of current problem (include personal factors and/or comorbidities that impact the POC) Patient reports dizziness started after she got home after nursing home stay following surgery.  Worse with getting up in the morning and rolling over at night.  Worse when rolling toward the left side.  Dizziness lasted a couple seconds.  Would sit steady for a minute or sometimes less, and dizziness would go away.  Seems to go away when up and moving around during the daytime.  No change in vision or hearing with onset of dizziness symptoms.  PMH includes CVA about 1 year ago per patient report.  Denies any residual leg or arm weakness after stroke.  Patient reports she was told she had a stroke.  Doesn't remember this well.  Does not recall having had any arm or leg weakness.  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  Denies any residual deficits after that episode as well.  Was able to go home after hospitalization.  **After visit, I was able to speak with patient's  who clarified that the anurysm was the cause of the patient's stroke - these events occured at the same time.  Initial difficulty with vision, memory, and balance, which have all improved somewhat, but remain impaired.   Patient role/Employment history Retired   [facility]   Home/Community Accessibility Comments 4 steps to enter, all needs on main floor (laundry in basement, but  is doing that job currently)   Current Assistive Devices Four Wheeled Walker;Standard Cane   Assistive Devices Comments 4WW more  outdoors and cane indoors   Patient/Family Goals Statement eliminate dizziness   General Information Comments Patient was evaluated for left leg pain and dysfunction by [facility] PT on 7/26/21, and 1 follow up with [facility] PT.  Future visits will be for BPPV and LLE as needed/appropriate.    Pain   Patient currently in pain Yes   Pain location L knee   Pain rating 1-2/10   Pain description Ache   Pain description comment Patient feels due to arthritis.   Oculomotor Exam   Smooth Pursuit Abnormal   Smooth Pursuit Comment saccadic intrusions with right gaze   Saccades Normal   VOR Normal   VOR Comments challenging to keep eyes focused on target, no dizziness reported   VOR Cancellation Abnormal   VOR Cancellation Comments corrective saccades with R and L motion   Rapid Head Thrust Corrective Saccade L head thrust   Convergence Testing Normal   Infrared Goggle Exam or Frenzel Lense Exam   Vestibular Suppressant in Last 24 Hours? No   Exam completed with Frenzel Lenses   Spontaneous Nystagmus Negative   Gaze Evoked Nystagmus Negative   [place]-Hallpike (right) Negative   [place]-Hallpike (Left) Upbeating L torsional   Maryellen-Hallpike (left) comments 2nd trial of L [place] Hallpike after treatment = negative   Planned Therapy Interventions   Planned Therapy Interventions balance training;neuromuscular re-education;transfer training;visual perception  (vestibular therapy)   Clinical Impression   Criteria for Skilled Therapeutic Interventions Met yes, treatment indicated   PT Diagnosis impaired mobility   Influenced by the following impairments dizziness, impaired balance   Functional limitations due to impairments transfers, rolling   Clinical Presentation Unstable/Unpredictable   Clinical Presentation Rationale clinical observation   Clinical Decision Making (Complexity) Moderate complexity   Therapy Frequency   (4 visits)   Predicted Duration of Therapy Intervention (days/wks) 4 weeks   Risk & Benefits of therapy have been  explained Yes   Patient, Family & other staff in agreement with plan of care Yes   Clinical Impression Comments Patient demonstrates signs and symptoms consistent with Left posterior canal canalithiasis, and underlying central vestibular dysfunction.     GOALS   PT Eval Goals 1;2   Goal 1   Goal Identifier transfers   Goal Description Patient will be able to complete supine-sit transfers with no limitation due to feeling of dizziness for safe bed mobility.   Target Date 09/03/21   Goal 2   Goal Identifier rolling   Goal Description Patient will be able to roll R/L with no limitation due to dizziness for improved safety with bed mobility.   Target Date 09/03/21   Total Evaluation Time   PT Eval, Low Complexity Minutes (02295) 30   Therapy Certification   Certification date from 08/06/21   Certification date to 09/03/21   Medical Diagnosis BPV

## 2021-08-13 ENCOUNTER — HOSPITAL ENCOUNTER (OUTPATIENT)
Dept: PHYSICAL THERAPY | Facility: OTHER | Age: 76
Setting detail: THERAPIES SERIES
End: 2021-08-13
Attending: FAMILY MEDICINE
Payer: COMMERCIAL

## 2021-08-13 PROCEDURE — 97110 THERAPEUTIC EXERCISES: CPT | Mod: GP | Performed by: PHYSICAL THERAPIST

## 2021-08-18 ENCOUNTER — HOSPITAL ENCOUNTER (OUTPATIENT)
Dept: PHYSICAL THERAPY | Facility: OTHER | Age: 76
Setting detail: THERAPIES SERIES
End: 2021-08-18
Attending: FAMILY MEDICINE
Payer: COMMERCIAL

## 2021-08-18 PROCEDURE — 97110 THERAPEUTIC EXERCISES: CPT | Mod: GP | Performed by: PHYSICAL THERAPIST

## 2021-08-20 ENCOUNTER — HOSPITAL ENCOUNTER (OUTPATIENT)
Dept: PHYSICAL THERAPY | Facility: OTHER | Age: 76
Setting detail: THERAPIES SERIES
End: 2021-08-20
Attending: FAMILY MEDICINE
Payer: COMMERCIAL

## 2021-08-20 PROCEDURE — 97110 THERAPEUTIC EXERCISES: CPT | Mod: GP | Performed by: PHYSICAL THERAPIST

## 2021-08-25 ENCOUNTER — HOSPITAL ENCOUNTER (OUTPATIENT)
Dept: PHYSICAL THERAPY | Facility: OTHER | Age: 76
Setting detail: THERAPIES SERIES
End: 2021-08-25
Attending: FAMILY MEDICINE
Payer: COMMERCIAL

## 2021-08-25 PROCEDURE — 97110 THERAPEUTIC EXERCISES: CPT | Mod: GP | Performed by: PHYSICAL THERAPIST

## 2021-08-27 ENCOUNTER — HOSPITAL ENCOUNTER (OUTPATIENT)
Dept: PHYSICAL THERAPY | Facility: OTHER | Age: 76
Setting detail: THERAPIES SERIES
End: 2021-08-27
Attending: FAMILY MEDICINE
Payer: COMMERCIAL

## 2021-08-27 PROCEDURE — 97110 THERAPEUTIC EXERCISES: CPT | Mod: GP | Performed by: PHYSICAL THERAPIST

## 2021-09-03 ENCOUNTER — HOSPITAL ENCOUNTER (OUTPATIENT)
Dept: PHYSICAL THERAPY | Facility: OTHER | Age: 76
Setting detail: THERAPIES SERIES
End: 2021-09-03
Attending: FAMILY MEDICINE
Payer: COMMERCIAL

## 2021-09-03 PROCEDURE — 97110 THERAPEUTIC EXERCISES: CPT | Mod: GP | Performed by: PHYSICAL THERAPIST

## 2021-09-08 ENCOUNTER — HOSPITAL ENCOUNTER (OUTPATIENT)
Dept: PHYSICAL THERAPY | Facility: OTHER | Age: 76
Setting detail: THERAPIES SERIES
End: 2021-09-08
Attending: FAMILY MEDICINE
Payer: COMMERCIAL

## 2021-09-08 PROCEDURE — 97110 THERAPEUTIC EXERCISES: CPT | Mod: GP | Performed by: PHYSICAL THERAPIST

## 2021-09-10 ENCOUNTER — HOSPITAL ENCOUNTER (OUTPATIENT)
Dept: PHYSICAL THERAPY | Facility: OTHER | Age: 76
Setting detail: THERAPIES SERIES
End: 2021-09-10
Attending: FAMILY MEDICINE
Payer: COMMERCIAL

## 2021-09-10 PROCEDURE — 97110 THERAPEUTIC EXERCISES: CPT | Mod: GP | Performed by: PHYSICAL THERAPIST

## 2021-09-17 ENCOUNTER — HOSPITAL ENCOUNTER (OUTPATIENT)
Dept: PHYSICAL THERAPY | Facility: OTHER | Age: 76
Setting detail: THERAPIES SERIES
End: 2021-09-17
Attending: FAMILY MEDICINE
Payer: COMMERCIAL

## 2021-09-17 PROCEDURE — 97110 THERAPEUTIC EXERCISES: CPT | Mod: GP | Performed by: PHYSICAL THERAPIST

## 2021-09-22 ENCOUNTER — HOSPITAL ENCOUNTER (OUTPATIENT)
Dept: PHYSICAL THERAPY | Facility: OTHER | Age: 76
Setting detail: THERAPIES SERIES
End: 2021-09-22
Attending: FAMILY MEDICINE
Payer: COMMERCIAL

## 2021-09-22 PROCEDURE — 97110 THERAPEUTIC EXERCISES: CPT | Mod: GP | Performed by: PHYSICAL THERAPIST

## 2021-09-24 ENCOUNTER — HOSPITAL ENCOUNTER (OUTPATIENT)
Dept: PHYSICAL THERAPY | Facility: OTHER | Age: 76
Setting detail: THERAPIES SERIES
End: 2021-09-24
Attending: FAMILY MEDICINE
Payer: COMMERCIAL

## 2021-09-24 PROCEDURE — 97110 THERAPEUTIC EXERCISES: CPT | Mod: GP | Performed by: PHYSICAL THERAPIST

## 2021-09-29 ENCOUNTER — HOSPITAL ENCOUNTER (OUTPATIENT)
Dept: PHYSICAL THERAPY | Facility: OTHER | Age: 76
Setting detail: THERAPIES SERIES
End: 2021-09-29
Attending: FAMILY MEDICINE
Payer: COMMERCIAL

## 2021-09-29 PROCEDURE — 97110 THERAPEUTIC EXERCISES: CPT | Mod: GP | Performed by: PHYSICAL THERAPIST

## 2021-09-29 NOTE — PROGRESS NOTES
"Outpatient Physical Therapy Discharge Note     Patient: Altagracia Johnson  : 1945    Beginning/End Dates of Reporting Period:  21 to 21    Referring Provider: Dr. Nguyen    Therapy Diagnosis: S72.22XD, Closed displaced subtrochanteric fracture of left femur with routine healing         Client Self Report: Patient reports minimal knee discomfort.  No hip pain.  Continues to use 4WW for longer distances and outdoors/uneven terrain and SEC otherwise.  Occasionally walks around in kitchen area with no assistive device, but has countertops to hold onto.  Reports the other day she was able to use her cane to walk out onto her deck, down a few steps, and out into the yard a ways where her  was doing some work.  Feels she has definitely gotten stronger and has improved endurance.  Most limited by balance with SEC on uneven terrain, but this has also improved.      Objective Measurements:  Objective Measure: pain rating (L hip, knee)  Details: 0-1/10 in knee     *Please see Progress comments in Goals section.        Goals:  Goal Identifier balance   Goal Description pt will complete B single leg stance for 3 sec without UE assist in order to improve stability for gait with SEC.    Target Date 21   Date Met  21   Progress (detail required for progress note): 8 sec for RLE, 2 sec for LLE     Goal Identifier Strength   Goal Description Pt will complete 4 sit<>stands from average height chair without UE assist in 30 sec to progress functional stability for gait and transfers.    Target Date 21   Date Met  21   Progress (detail required for progress note): able to complete 4x Stand-sit with good eccwentric control to 17\" seat;  Requires min UE assist for sit-stand from low chair.  Able to complete 10x with no UE assist from 20\" seat.     Goal Identifier stairs   Goal Description Pt will ascend/descend 12 stairs with railing and SEC with SBA to progress towards ability to get in/out " of home safely.    Target Date 09/06/21   Date Met      Progress (detail required for progress note): 7 steps with step-to pattern, 7 steps with reciprocal pattern using SEC and railing     Goal Identifier Gait   Goal Description Pt will ambulate with SEC independently across 200 feet of outdoor terrain to progress towards accessing the community and returning to prior function.    Target Date 10/04/21   Date Met      Progress (detail required for progress note): Walked 200ft out of dept, down gradually sloping grass/uneven terrain, and up 7 steps with SEC, CGA x 1.         Plan:  Patient was seen 14 sessions total for Left hip.  Patient demonstrates improved functional strength and endurance.  Improved balance and gait with decreased need for assistive device for support, but continues to require at least a SEC outdoors and a 4WW for longer distances.      Discharge:    Reason for Discharge: Patient has adequately met goals and also feels she can continue with HEP.      Equipment Issued: none    Discharge Plan: Patient to continue home program.

## 2021-09-29 NOTE — PROGRESS NOTES
Outpatient Physical Therapy Discharge Note     Patient: Altagracia Johnson  : 1945    Beginning/End Dates of Reporting Period:  21 to 21    Referring Provider: Mike Harris PA-C    Therapy Diagnosis: BPV        Client Self Report: NA - Patient was seen for evaluation on 21 and follow up on 21 reporting dizziness/spinning had been eliminated.  Patient has been working with physical therapy for left hip, but was not treated for BPV since 21 and denies any symptoms since then.     Objective Measurements:  NA      Goals:  Goal Identifier transfers   Goal Description Patient will be able to complete supine-sit transfers with no limitation due to feeling of dizziness for safe bed mobility.   Target Date 21   Date Met  21   Progress (detail required for progress note):  Goal met     Goal Identifier rolling   Goal Description Patient will be able to roll R/L with no limitation due to dizziness for improved safety with bed mobility.   Target Date 21   Date Met  21   Progress (detail required for progress note):   Goal met       Plan:  Patient reports symptoms resolution.  Will discharge from PT.    Discharge:    Reason for Discharge: Patient has met all goals.    Equipment Issued: none    Discharge Plan: none